# Patient Record
Sex: FEMALE | Race: WHITE | NOT HISPANIC OR LATINO | URBAN - METROPOLITAN AREA
[De-identification: names, ages, dates, MRNs, and addresses within clinical notes are randomized per-mention and may not be internally consistent; named-entity substitution may affect disease eponyms.]

---

## 2018-05-29 ENCOUNTER — OUTPATIENT (OUTPATIENT)
Dept: OUTPATIENT SERVICES | Facility: HOSPITAL | Age: 38
LOS: 1 days | Discharge: HOME | End: 2018-05-29

## 2018-05-29 DIAGNOSIS — F33.9 MAJOR DEPRESSIVE DISORDER, RECURRENT, UNSPECIFIED: ICD-10-CM

## 2018-08-02 ENCOUNTER — OUTPATIENT (OUTPATIENT)
Dept: OUTPATIENT SERVICES | Facility: HOSPITAL | Age: 38
LOS: 1 days | End: 2018-08-02
Payer: COMMERCIAL

## 2018-08-02 ENCOUNTER — APPOINTMENT (OUTPATIENT)
Dept: OBGYN | Facility: CLINIC | Age: 38
End: 2018-08-02
Payer: COMMERCIAL

## 2018-08-02 VITALS
DIASTOLIC BLOOD PRESSURE: 66 MMHG | BODY MASS INDEX: 20.24 KG/M2 | WEIGHT: 110 LBS | SYSTOLIC BLOOD PRESSURE: 100 MMHG | HEIGHT: 62 IN | HEART RATE: 78 BPM | OXYGEN SATURATION: 98 %

## 2018-08-02 DIAGNOSIS — R25.1 TREMOR, UNSPECIFIED: ICD-10-CM

## 2018-08-02 DIAGNOSIS — F32.9 MAJOR DEPRESSIVE DISORDER, SINGLE EPISODE, UNSPECIFIED: ICD-10-CM

## 2018-08-02 DIAGNOSIS — D21.9 BENIGN NEOPLASM OF CONNECTIVE AND OTHER SOFT TISSUE, UNSPECIFIED: ICD-10-CM

## 2018-08-02 DIAGNOSIS — N92.6 IRREGULAR MENSTRUATION, UNSPECIFIED: ICD-10-CM

## 2018-08-02 DIAGNOSIS — F41.9 ANXIETY DISORDER, UNSPECIFIED: ICD-10-CM

## 2018-08-02 DIAGNOSIS — F90.2 ATTENTION-DEFICIT HYPERACTIVITY DISORDER, COMBINED TYPE: ICD-10-CM

## 2018-08-02 DIAGNOSIS — Z80.0 FAMILY HISTORY OF MALIGNANT NEOPLASM OF DIGESTIVE ORGANS: ICD-10-CM

## 2018-08-02 DIAGNOSIS — Z82.49 FAMILY HISTORY OF ISCHEMIC HEART DISEASE AND OTHER DISEASES OF THE CIRCULATORY SYSTEM: ICD-10-CM

## 2018-08-02 DIAGNOSIS — Z83.3 FAMILY HISTORY OF DIABETES MELLITUS: ICD-10-CM

## 2018-08-02 LAB
HCG UR QL: NEGATIVE
QUALITY CONTROL: YES

## 2018-08-02 PROCEDURE — 76830 TRANSVAGINAL US NON-OB: CPT | Mod: 26

## 2018-08-02 PROCEDURE — 76856 US EXAM PELVIC COMPLETE: CPT | Mod: 26

## 2018-08-02 PROCEDURE — 76830 TRANSVAGINAL US NON-OB: CPT

## 2018-08-02 PROCEDURE — 99385 PREV VISIT NEW AGE 18-39: CPT

## 2018-08-02 PROCEDURE — 76856 US EXAM PELVIC COMPLETE: CPT

## 2018-08-02 RX ORDER — PROPRANOLOL HYDROCHLORIDE 80 MG/1
80 CAPSULE, EXTENDED RELEASE ORAL
Qty: 60 | Refills: 0 | Status: ACTIVE | COMMUNITY
Start: 2017-03-30

## 2018-08-02 RX ORDER — ZOLPIDEM TARTRATE 12.5 MG/1
12.5 TABLET, EXTENDED RELEASE ORAL
Qty: 30 | Refills: 0 | Status: ACTIVE | COMMUNITY
Start: 2018-02-19

## 2018-08-03 LAB
C TRACH RRNA SPEC QL NAA+PROBE: NOT DETECTED
HPV HIGH+LOW RISK DNA PNL CVX: NOT DETECTED
N GONORRHOEA RRNA SPEC QL NAA+PROBE: NOT DETECTED
SOURCE TP AMPLIFICATION: NORMAL

## 2018-08-07 LAB — CYTOLOGY CVX/VAG DOC THIN PREP: NORMAL

## 2018-08-27 ENCOUNTER — APPOINTMENT (OUTPATIENT)
Dept: PULMONOLOGY | Facility: CLINIC | Age: 38
End: 2018-08-27
Payer: COMMERCIAL

## 2018-08-27 VITALS
SYSTOLIC BLOOD PRESSURE: 110 MMHG | OXYGEN SATURATION: 99 % | WEIGHT: 110 LBS | HEIGHT: 62 IN | DIASTOLIC BLOOD PRESSURE: 60 MMHG | BODY MASS INDEX: 20.24 KG/M2 | HEART RATE: 82 BPM | TEMPERATURE: 97.6 F | RESPIRATION RATE: 15 BRPM

## 2018-08-27 DIAGNOSIS — F51.04 PSYCHOPHYSIOLOGIC INSOMNIA: ICD-10-CM

## 2018-08-27 PROCEDURE — 99204 OFFICE O/P NEW MOD 45 MIN: CPT | Mod: GC

## 2018-08-27 RX ORDER — DEXTROAMPHETAMINE SACCHARATE, AMPHETAMINE ASPARTATE, DEXTROAMPHETAMINE SULFATE AND AMPHETAMINE SULFATE 5; 5; 5; 5 MG/1; MG/1; MG/1; MG/1
20 TABLET ORAL
Qty: 30 | Refills: 0 | Status: DISCONTINUED | COMMUNITY
Start: 2018-03-01 | End: 2018-08-27

## 2018-08-27 RX ORDER — CYCLOBENZAPRINE HYDROCHLORIDE 5 MG/1
5 TABLET, FILM COATED ORAL
Qty: 20 | Refills: 0 | Status: DISCONTINUED | COMMUNITY
Start: 2018-02-27 | End: 2018-08-27

## 2018-08-27 RX ORDER — GABAPENTIN 600 MG/1
600 TABLET, COATED ORAL
Refills: 0 | Status: ACTIVE | COMMUNITY
Start: 2018-03-14

## 2018-08-27 RX ORDER — DOXEPIN HYDROCHLORIDE 25 MG/1
25 CAPSULE ORAL
Qty: 60 | Refills: 0 | Status: DISCONTINUED | COMMUNITY
Start: 2018-04-27 | End: 2018-08-27

## 2018-08-28 PROBLEM — F51.04 CHRONIC INSOMNIA: Status: ACTIVE | Noted: 2018-08-27

## 2018-10-15 ENCOUNTER — FORM ENCOUNTER (OUTPATIENT)
Age: 38
End: 2018-10-15

## 2018-10-16 ENCOUNTER — APPOINTMENT (OUTPATIENT)
Dept: ULTRASOUND IMAGING | Facility: CLINIC | Age: 38
End: 2018-10-16
Payer: COMMERCIAL

## 2018-10-16 ENCOUNTER — OTHER (OUTPATIENT)
Age: 38
End: 2018-10-16

## 2018-10-16 ENCOUNTER — OUTPATIENT (OUTPATIENT)
Dept: OUTPATIENT SERVICES | Facility: HOSPITAL | Age: 38
LOS: 1 days | End: 2018-10-16
Payer: COMMERCIAL

## 2018-10-16 ENCOUNTER — TRANSCRIPTION ENCOUNTER (OUTPATIENT)
Age: 38
End: 2018-10-16

## 2018-10-16 DIAGNOSIS — Z00.8 ENCOUNTER FOR OTHER GENERAL EXAMINATION: ICD-10-CM

## 2018-10-16 DIAGNOSIS — N83.209 UNSPECIFIED OVARIAN CYST, UNSPECIFIED SIDE: ICD-10-CM

## 2018-10-16 PROCEDURE — 76830 TRANSVAGINAL US NON-OB: CPT

## 2018-10-16 PROCEDURE — 76830 TRANSVAGINAL US NON-OB: CPT | Mod: 26

## 2018-10-16 PROCEDURE — 76856 US EXAM PELVIC COMPLETE: CPT

## 2018-10-16 PROCEDURE — 76856 US EXAM PELVIC COMPLETE: CPT | Mod: 26

## 2018-10-30 ENCOUNTER — APPOINTMENT (OUTPATIENT)
Dept: SURGERY | Facility: CLINIC | Age: 38
End: 2018-10-30
Payer: COMMERCIAL

## 2018-10-30 ENCOUNTER — LABORATORY RESULT (OUTPATIENT)
Age: 38
End: 2018-10-30

## 2018-10-30 VITALS
TEMPERATURE: 98.5 F | OXYGEN SATURATION: 100 % | RESPIRATION RATE: 15 BRPM | HEART RATE: 79 BPM | DIASTOLIC BLOOD PRESSURE: 80 MMHG | SYSTOLIC BLOOD PRESSURE: 117 MMHG

## 2018-10-30 DIAGNOSIS — G43.709 CHRONIC MIGRAINE W/OUT AURA, NOT INTRACTABLE, W/OUT STATUS MIGRAINOSUS: ICD-10-CM

## 2018-10-30 DIAGNOSIS — K59.00 CONSTIPATION, UNSPECIFIED: ICD-10-CM

## 2018-10-30 DIAGNOSIS — Z78.9 OTHER SPECIFIED HEALTH STATUS: ICD-10-CM

## 2018-10-30 DIAGNOSIS — K64.4 RESIDUAL HEMORRHOIDAL SKIN TAGS: ICD-10-CM

## 2018-10-30 DIAGNOSIS — K62.89 OTHER SPECIFIED DISEASES OF ANUS AND RECTUM: ICD-10-CM

## 2018-10-30 PROCEDURE — 99203 OFFICE O/P NEW LOW 30 MIN: CPT

## 2018-10-30 RX ORDER — DIAZEPAM 10 MG/1
10 TABLET ORAL
Refills: 0 | Status: DISCONTINUED | COMMUNITY
Start: 2018-03-16 | End: 2018-10-30

## 2018-10-30 RX ORDER — DEXTROAMPHETAMINE SACCHARATE, AMPHETAMINE ASPARTATE, DEXTROAMPHETAMINE SULFATE, AND AMPHETAMINE SULFATE 3.75; 3.75; 3.75; 3.75 MG/1; MG/1; MG/1; MG/1
15 TABLET ORAL
Refills: 0 | Status: ACTIVE | COMMUNITY

## 2018-10-30 RX ORDER — DEXTROAMPHETAMINE SACCHARATE, AMPHETAMINE ASPARTATE, DEXTROAMPHETAMINE SULFATE AND AMPHETAMINE SULFATE 3.75; 3.75; 3.75; 3.75 MG/1; MG/1; MG/1; MG/1
15 TABLET ORAL
Qty: 60 | Refills: 0 | Status: DISCONTINUED | COMMUNITY
Start: 2018-03-16 | End: 2018-10-30

## 2018-10-30 RX ORDER — SUVOREXANT 10 MG/1
10 TABLET, FILM COATED ORAL
Qty: 60 | Refills: 0 | Status: DISCONTINUED | COMMUNITY
Start: 2018-03-07 | End: 2018-10-30

## 2018-10-30 RX ORDER — VALACYCLOVIR 1 G/1
1 TABLET, FILM COATED ORAL
Qty: 60 | Refills: 0 | Status: DISCONTINUED | COMMUNITY
Start: 2018-07-26 | End: 2018-10-30

## 2018-10-30 RX ORDER — LINACLOTIDE 72 UG/1
CAPSULE, GELATIN COATED ORAL
Refills: 0 | Status: ACTIVE | COMMUNITY

## 2018-10-30 RX ORDER — DIAZEPAM 5 MG/1
TABLET ORAL
Refills: 0 | Status: ACTIVE | COMMUNITY

## 2018-10-30 RX ORDER — SUVOREXANT 20 MG/1
20 TABLET, FILM COATED ORAL
Qty: 60 | Refills: 0 | Status: DISCONTINUED | COMMUNITY
Start: 2018-04-02 | End: 2018-10-30

## 2018-10-30 RX ORDER — RAMELTEON 8 MG/1
8 TABLET, FILM COATED ORAL
Qty: 30 | Refills: 3 | Status: DISCONTINUED | COMMUNITY
Start: 2018-08-27 | End: 2018-10-30

## 2018-12-06 ENCOUNTER — APPOINTMENT (OUTPATIENT)
Dept: OBGYN | Facility: CLINIC | Age: 38
End: 2018-12-06
Payer: COMMERCIAL

## 2018-12-06 VITALS
BODY MASS INDEX: 21.16 KG/M2 | WEIGHT: 115 LBS | SYSTOLIC BLOOD PRESSURE: 102 MMHG | HEART RATE: 75 BPM | RESPIRATION RATE: 16 BRPM | OXYGEN SATURATION: 99 % | DIASTOLIC BLOOD PRESSURE: 76 MMHG | HEIGHT: 62 IN

## 2018-12-06 DIAGNOSIS — N76.0 ACUTE VAGINITIS: ICD-10-CM

## 2018-12-06 DIAGNOSIS — L29.2 PRURITUS VULVAE: ICD-10-CM

## 2018-12-06 DIAGNOSIS — N76.2 ACUTE VULVITIS: ICD-10-CM

## 2018-12-06 DIAGNOSIS — R39.9 UNSPECIFIED SYMPTOMS AND SIGNS INVOLVING THE GENITOURINARY SYSTEM: ICD-10-CM

## 2018-12-06 LAB
BILIRUB UR QL STRIP: NEGATIVE
GLUCOSE UR-MCNC: NEGATIVE
HCG UR QL: 0.2 EU/DL
HGB UR QL STRIP.AUTO: NEGATIVE
KETONES UR-MCNC: NEGATIVE
LEUKOCYTE ESTERASE UR QL STRIP: NEGATIVE
NITRITE UR QL STRIP: NEGATIVE
PH UR STRIP: 6.5
PROT UR STRIP-MCNC: NORMAL
SP GR UR STRIP: 1.03

## 2018-12-06 PROCEDURE — 99214 OFFICE O/P EST MOD 30 MIN: CPT

## 2018-12-07 LAB
C TRACH RRNA SPEC QL NAA+PROBE: NOT DETECTED
N GONORRHOEA RRNA SPEC QL NAA+PROBE: NOT DETECTED
SOURCE AMPLIFICATION: NORMAL

## 2018-12-09 ENCOUNTER — TRANSCRIPTION ENCOUNTER (OUTPATIENT)
Age: 38
End: 2018-12-09

## 2018-12-09 LAB
BACTERIA UR CULT: NORMAL
CANDIDA VAG CYTO: NOT DETECTED
G VAGINALIS+PREV SP MTYP VAG QL MICRO: DETECTED
T VAGINALIS VAG QL WET PREP: NOT DETECTED

## 2018-12-12 ENCOUNTER — OTHER (OUTPATIENT)
Age: 38
End: 2018-12-12

## 2018-12-13 ENCOUNTER — OTHER (OUTPATIENT)
Age: 38
End: 2018-12-13

## 2018-12-17 ENCOUNTER — TRANSCRIPTION ENCOUNTER (OUTPATIENT)
Age: 38
End: 2018-12-17

## 2019-05-06 ENCOUNTER — TRANSCRIPTION ENCOUNTER (OUTPATIENT)
Age: 39
End: 2019-05-06

## 2019-05-23 ENCOUNTER — APPOINTMENT (OUTPATIENT)
Dept: OBGYN | Facility: CLINIC | Age: 39
End: 2019-05-23
Payer: COMMERCIAL

## 2019-05-23 ENCOUNTER — APPOINTMENT (OUTPATIENT)
Dept: OBGYN | Facility: CLINIC | Age: 39
End: 2019-05-23

## 2019-05-23 VITALS
BODY MASS INDEX: 20.61 KG/M2 | DIASTOLIC BLOOD PRESSURE: 86 MMHG | SYSTOLIC BLOOD PRESSURE: 123 MMHG | HEART RATE: 73 BPM | WEIGHT: 112 LBS | HEIGHT: 62 IN

## 2019-05-23 DIAGNOSIS — Z31.69 ENCOUNTER FOR OTHER GENERAL COUNSELING AND ADVICE ON PROCREATION: ICD-10-CM

## 2019-05-23 PROCEDURE — 99213 OFFICE O/P EST LOW 20 MIN: CPT

## 2019-05-28 NOTE — PHYSICAL EXAM
[Alert] : alert [Awake] : awake [Acute Distress] : no acute distress [Oriented x3] : oriented to person, place, and time [Depressed Mood] : not depressed [Flat Affect] : affect not flat

## 2019-07-13 ENCOUNTER — APPOINTMENT (OUTPATIENT)
Dept: RHEUMATOLOGY | Facility: CLINIC | Age: 39
End: 2019-07-13
Payer: COMMERCIAL

## 2019-07-13 VITALS
DIASTOLIC BLOOD PRESSURE: 65 MMHG | HEART RATE: 84 BPM | BODY MASS INDEX: 20.24 KG/M2 | HEIGHT: 62 IN | SYSTOLIC BLOOD PRESSURE: 100 MMHG | TEMPERATURE: 98.6 F | WEIGHT: 110 LBS | OXYGEN SATURATION: 98 %

## 2019-07-13 DIAGNOSIS — G43.909 MIGRAINE, UNSPECIFIED, NOT INTRACTABLE, W/OUT STATUS MIGRAINOSUS: ICD-10-CM

## 2019-07-13 PROCEDURE — 99204 OFFICE O/P NEW MOD 45 MIN: CPT

## 2019-08-12 ENCOUNTER — LABORATORY RESULT (OUTPATIENT)
Age: 39
End: 2019-08-12

## 2019-08-12 ENCOUNTER — APPOINTMENT (OUTPATIENT)
Dept: RHEUMATOLOGY | Facility: CLINIC | Age: 39
End: 2019-08-12
Payer: COMMERCIAL

## 2019-08-12 VITALS
HEIGHT: 62 IN | HEART RATE: 58 BPM | TEMPERATURE: 97.5 F | OXYGEN SATURATION: 97 % | BODY MASS INDEX: 20.24 KG/M2 | DIASTOLIC BLOOD PRESSURE: 77 MMHG | SYSTOLIC BLOOD PRESSURE: 114 MMHG | WEIGHT: 110 LBS

## 2019-08-12 LAB — LUPUS ANTICOAGULANT CASCADE REFLEX: NORMAL

## 2019-08-12 PROCEDURE — 99215 OFFICE O/P EST HI 40 MIN: CPT

## 2019-08-13 LAB
25(OH)D3 SERPL-MCNC: 21.2 NG/ML
ALBUMIN SERPL ELPH-MCNC: 4.9 G/DL
ALP BLD-CCNC: 59 U/L
ALT SERPL-CCNC: 32 U/L
ANION GAP SERPL CALC-SCNC: 11 MMOL/L
APPEARANCE: CLEAR
AST SERPL-CCNC: 31 U/L
BACTERIA: ABNORMAL
BASOPHILS # BLD AUTO: 0.05 K/UL
BASOPHILS NFR BLD AUTO: 0.6 %
BILIRUB SERPL-MCNC: 0.2 MG/DL
BILIRUBIN URINE: NEGATIVE
BLOOD URINE: NEGATIVE
BUN SERPL-MCNC: 42 MG/DL
C3 SERPL-MCNC: 111 MG/DL
C4 SERPL-MCNC: 40 MG/DL
CALCIUM SERPL-MCNC: 10 MG/DL
CHLORIDE SERPL-SCNC: 105 MMOL/L
CK SERPL-CCNC: 289 U/L
CO2 SERPL-SCNC: 16 MMOL/L
COLOR: YELLOW
CREAT SERPL-MCNC: 1.73 MG/DL
CREAT SPEC-SCNC: 138 MG/DL
CREAT/PROT UR: 0.2 RATIO
ENA RNP AB SER IA-ACNC: <0.2 AL
ENA SM AB SER IA-ACNC: <0.2 AL
ENA SS-A AB SER IA-ACNC: <0.2 AL
ENA SS-B AB SER IA-ACNC: <0.2 AL
EOSINOPHIL # BLD AUTO: 0.01 K/UL
EOSINOPHIL NFR BLD AUTO: 0.1 %
GLUCOSE QUALITATIVE U: NEGATIVE
GLUCOSE SERPL-MCNC: 84 MG/DL
HCT VFR BLD CALC: 41.6 %
HGB BLD-MCNC: 13.8 G/DL
HYALINE CASTS: 1 /LPF
IMM GRANULOCYTES NFR BLD AUTO: 0.7 %
INR PPP: 0.96 RATIO
KETONES URINE: NEGATIVE
LEUKOCYTE ESTERASE URINE: NEGATIVE
LYMPHOCYTES # BLD AUTO: 2.91 K/UL
LYMPHOCYTES NFR BLD AUTO: 33.6 %
MAN DIFF?: NORMAL
MCHC RBC-ENTMCNC: 33.1 PG
MCHC RBC-ENTMCNC: 33.2 GM/DL
MCV RBC AUTO: 99.8 FL
MICROSCOPIC-UA: NORMAL
MONOCYTES # BLD AUTO: 0.7 K/UL
MONOCYTES NFR BLD AUTO: 8.1 %
NEUTROPHILS # BLD AUTO: 4.94 K/UL
NEUTROPHILS NFR BLD AUTO: 56.9 %
NITRITE URINE: NEGATIVE
PH URINE: 6
PLATELET # BLD AUTO: 329 K/UL
POTASSIUM SERPL-SCNC: 5.4 MMOL/L
PROT SERPL-MCNC: 8 G/DL
PROT UR-MCNC: 28 MG/DL
PROTEIN URINE: ABNORMAL
PT BLD: 10.8 SEC
RBC # BLD: 4.17 M/UL
RBC # FLD: 14 %
RED BLOOD CELLS URINE: 2 /HPF
SODIUM SERPL-SCNC: 132 MMOL/L
SPECIFIC GRAVITY URINE: 1.03
SQUAMOUS EPITHELIAL CELLS: 4 /HPF
THYROGLOB AB SERPL-ACNC: <20 IU/ML
THYROPEROXIDASE AB SERPL IA-ACNC: <10 IU/ML
TSH SERPL-ACNC: 5.54 UIU/ML
UROBILINOGEN URINE: NORMAL
WBC # FLD AUTO: 8.67 K/UL
WHITE BLOOD CELLS URINE: 4 /HPF

## 2019-08-14 LAB — DSDNA AB SER-ACNC: 34 IU/ML

## 2019-08-16 LAB — ANA SER IF-ACNC: NEGATIVE

## 2019-08-17 ENCOUNTER — LABORATORY RESULT (OUTPATIENT)
Age: 39
End: 2019-08-17

## 2019-08-17 ENCOUNTER — TRANSCRIPTION ENCOUNTER (OUTPATIENT)
Age: 39
End: 2019-08-17

## 2019-08-17 LAB
ANION GAP SERPL CALC-SCNC: 9 MMOL/L
BUN SERPL-MCNC: 18 MG/DL
CALCIUM SERPL-MCNC: 8.7 MG/DL
CHLORIDE SERPL-SCNC: 110 MMOL/L
CK SERPL-CCNC: 141 U/L
CO2 SERPL-SCNC: 24 MMOL/L
CREAT SERPL-MCNC: 1.03 MG/DL
GLUCOSE SERPL-MCNC: 96 MG/DL
POTASSIUM SERPL-SCNC: 4.7 MMOL/L
SODIUM SERPL-SCNC: 143 MMOL/L
T3RU NFR SERPL: 0.9 TBI
T4 SERPL-MCNC: 7.5 UG/DL
TSH SERPL-ACNC: 1.75 UIU/ML

## 2019-08-20 LAB
MYELOPEROXIDASE AB SER QL IA: <5 UNITS
MYELOPEROXIDASE CELLS FLD QL: NEGATIVE
PROTEINASE3 AB SER IA-ACNC: 16.4 UNITS
PROTEINASE3 AB SER-ACNC: NEGATIVE

## 2019-09-24 ENCOUNTER — TRANSCRIPTION ENCOUNTER (OUTPATIENT)
Age: 39
End: 2019-09-24

## 2019-10-26 ENCOUNTER — TRANSCRIPTION ENCOUNTER (OUTPATIENT)
Age: 39
End: 2019-10-26

## 2019-12-05 ENCOUNTER — RX RENEWAL (OUTPATIENT)
Age: 39
End: 2019-12-05

## 2019-12-05 ENCOUNTER — MEDICATION RENEWAL (OUTPATIENT)
Age: 39
End: 2019-12-05

## 2019-12-07 ENCOUNTER — MEDICATION RENEWAL (OUTPATIENT)
Age: 39
End: 2019-12-07

## 2020-03-18 ENCOUNTER — INPATIENT (INPATIENT)
Facility: HOSPITAL | Age: 40
LOS: 1 days | Discharge: HOME | DRG: 195 | End: 2020-03-20
Attending: INTERNAL MEDICINE | Admitting: HOSPITALIST
Payer: COMMERCIAL

## 2020-03-18 VITALS
WEIGHT: 119.93 LBS | DIASTOLIC BLOOD PRESSURE: 68 MMHG | HEIGHT: 62 IN | RESPIRATION RATE: 26 BRPM | SYSTOLIC BLOOD PRESSURE: 100 MMHG | OXYGEN SATURATION: 99 % | TEMPERATURE: 98 F | HEART RATE: 77 BPM

## 2020-03-18 DIAGNOSIS — G43.909 MIGRAINE, UNSPECIFIED, NOT INTRACTABLE, WITHOUT STATUS MIGRAINOSUS: ICD-10-CM

## 2020-03-18 DIAGNOSIS — J15.9 UNSPECIFIED BACTERIAL PNEUMONIA: ICD-10-CM

## 2020-03-18 DIAGNOSIS — M32.9 SYSTEMIC LUPUS ERYTHEMATOSUS, UNSPECIFIED: ICD-10-CM

## 2020-03-18 DIAGNOSIS — J18.9 PNEUMONIA, UNSPECIFIED ORGANISM: ICD-10-CM

## 2020-03-18 DIAGNOSIS — R09.89 OTHER SPECIFIED SYMPTOMS AND SIGNS INVOLVING THE CIRCULATORY AND RESPIRATORY SYSTEMS: ICD-10-CM

## 2020-03-18 DIAGNOSIS — G47.00 INSOMNIA, UNSPECIFIED: ICD-10-CM

## 2020-03-18 DIAGNOSIS — Z29.9 ENCOUNTER FOR PROPHYLACTIC MEASURES, UNSPECIFIED: ICD-10-CM

## 2020-03-18 DIAGNOSIS — E03.9 HYPOTHYROIDISM, UNSPECIFIED: ICD-10-CM

## 2020-03-18 LAB
ALBUMIN SERPL ELPH-MCNC: 3.5 G/DL — SIGNIFICANT CHANGE UP (ref 3.3–5)
ALP SERPL-CCNC: 56 U/L — SIGNIFICANT CHANGE UP (ref 40–120)
ALT FLD-CCNC: 26 U/L — SIGNIFICANT CHANGE UP (ref 10–45)
ANION GAP SERPL CALC-SCNC: 13 MMOL/L — SIGNIFICANT CHANGE UP (ref 5–17)
AST SERPL-CCNC: 28 U/L — SIGNIFICANT CHANGE UP (ref 10–40)
BASOPHILS # BLD AUTO: 0.04 K/UL — SIGNIFICANT CHANGE UP (ref 0–0.2)
BASOPHILS NFR BLD AUTO: 0.4 % — SIGNIFICANT CHANGE UP (ref 0–2)
BILIRUB SERPL-MCNC: 0.1 MG/DL — LOW (ref 0.2–1.2)
BUN SERPL-MCNC: 16 MG/DL — SIGNIFICANT CHANGE UP (ref 7–23)
CALCIUM SERPL-MCNC: 8.6 MG/DL — SIGNIFICANT CHANGE UP (ref 8.4–10.5)
CHLORIDE SERPL-SCNC: 105 MMOL/L — SIGNIFICANT CHANGE UP (ref 96–108)
CO2 SERPL-SCNC: 21 MMOL/L — LOW (ref 22–31)
CREAT SERPL-MCNC: 0.88 MG/DL — SIGNIFICANT CHANGE UP (ref 0.5–1.3)
EOSINOPHIL # BLD AUTO: 0.01 K/UL — SIGNIFICANT CHANGE UP (ref 0–0.5)
EOSINOPHIL NFR BLD AUTO: 0.1 % — SIGNIFICANT CHANGE UP (ref 0–6)
GLUCOSE SERPL-MCNC: 87 MG/DL — SIGNIFICANT CHANGE UP (ref 70–99)
HCT VFR BLD CALC: 35.1 % — SIGNIFICANT CHANGE UP (ref 34.5–45)
HGB BLD-MCNC: 11.1 G/DL — LOW (ref 11.5–15.5)
IMM GRANULOCYTES NFR BLD AUTO: 0.2 % — SIGNIFICANT CHANGE UP (ref 0–1.5)
LYMPHOCYTES # BLD AUTO: 1.18 K/UL — SIGNIFICANT CHANGE UP (ref 1–3.3)
LYMPHOCYTES # BLD AUTO: 13.3 % — SIGNIFICANT CHANGE UP (ref 13–44)
MCHC RBC-ENTMCNC: 31.6 GM/DL — LOW (ref 32–36)
MCHC RBC-ENTMCNC: 32.1 PG — SIGNIFICANT CHANGE UP (ref 27–34)
MCV RBC AUTO: 101.4 FL — HIGH (ref 80–100)
MONOCYTES # BLD AUTO: 0.63 K/UL — SIGNIFICANT CHANGE UP (ref 0–0.9)
MONOCYTES NFR BLD AUTO: 7.1 % — SIGNIFICANT CHANGE UP (ref 2–14)
NEUTROPHILS # BLD AUTO: 7.02 K/UL — SIGNIFICANT CHANGE UP (ref 1.8–7.4)
NEUTROPHILS NFR BLD AUTO: 78.9 % — HIGH (ref 43–77)
NRBC # BLD: 0 /100 WBCS — SIGNIFICANT CHANGE UP (ref 0–0)
PLATELET # BLD AUTO: 223 K/UL — SIGNIFICANT CHANGE UP (ref 150–400)
POTASSIUM SERPL-MCNC: 4.4 MMOL/L — SIGNIFICANT CHANGE UP (ref 3.5–5.3)
POTASSIUM SERPL-SCNC: 4.4 MMOL/L — SIGNIFICANT CHANGE UP (ref 3.5–5.3)
PROT SERPL-MCNC: 6.8 G/DL — SIGNIFICANT CHANGE UP (ref 6–8.3)
RAPID RVP RESULT: SIGNIFICANT CHANGE UP
RBC # BLD: 3.46 M/UL — LOW (ref 3.8–5.2)
RBC # FLD: 15.2 % — HIGH (ref 10.3–14.5)
SODIUM SERPL-SCNC: 139 MMOL/L — SIGNIFICANT CHANGE UP (ref 135–145)
WBC # BLD: 8.9 K/UL — SIGNIFICANT CHANGE UP (ref 3.8–10.5)
WBC # FLD AUTO: 8.9 K/UL — SIGNIFICANT CHANGE UP (ref 3.8–10.5)

## 2020-03-18 PROCEDURE — 99285 EMERGENCY DEPT VISIT HI MDM: CPT

## 2020-03-18 PROCEDURE — 71045 X-RAY EXAM CHEST 1 VIEW: CPT | Mod: 26

## 2020-03-18 PROCEDURE — 99223 1ST HOSP IP/OBS HIGH 75: CPT

## 2020-03-18 PROCEDURE — 93010 ELECTROCARDIOGRAM REPORT: CPT

## 2020-03-18 RX ORDER — HYDROXYCHLOROQUINE SULFATE 200 MG
1 TABLET ORAL
Qty: 0 | Refills: 0 | DISCHARGE

## 2020-03-18 RX ORDER — DIAZEPAM 5 MG
4 TABLET ORAL
Qty: 0 | Refills: 0 | DISCHARGE

## 2020-03-18 RX ORDER — ONDANSETRON 8 MG/1
4 TABLET, FILM COATED ORAL ONCE
Refills: 0 | Status: COMPLETED | OUTPATIENT
Start: 2020-03-18 | End: 2020-03-18

## 2020-03-18 RX ORDER — ZOLPIDEM TARTRATE 10 MG/1
5 TABLET ORAL AT BEDTIME
Refills: 0 | Status: DISCONTINUED | OUTPATIENT
Start: 2020-03-18 | End: 2020-03-20

## 2020-03-18 RX ORDER — SODIUM CHLORIDE 9 MG/ML
500 INJECTION INTRAMUSCULAR; INTRAVENOUS; SUBCUTANEOUS ONCE
Refills: 0 | Status: COMPLETED | OUTPATIENT
Start: 2020-03-18 | End: 2020-03-18

## 2020-03-18 RX ORDER — LEVOTHYROXINE SODIUM 125 MCG
1 TABLET ORAL
Qty: 0 | Refills: 0 | DISCHARGE

## 2020-03-18 RX ORDER — ONDANSETRON 8 MG/1
4 TABLET, FILM COATED ORAL EVERY 6 HOURS
Refills: 0 | Status: DISCONTINUED | OUTPATIENT
Start: 2020-03-18 | End: 2020-03-20

## 2020-03-18 RX ORDER — ACETAMINOPHEN 500 MG
975 TABLET ORAL ONCE
Refills: 0 | Status: DISCONTINUED | OUTPATIENT
Start: 2020-03-18 | End: 2020-03-18

## 2020-03-18 RX ORDER — IBUPROFEN 200 MG
400 TABLET ORAL ONCE
Refills: 0 | Status: COMPLETED | OUTPATIENT
Start: 2020-03-18 | End: 2020-03-18

## 2020-03-18 RX ORDER — HYDROXYCHLOROQUINE SULFATE 200 MG
200 TABLET ORAL
Refills: 0 | Status: DISCONTINUED | OUTPATIENT
Start: 2020-03-18 | End: 2020-03-20

## 2020-03-18 RX ORDER — DIAZEPAM 5 MG
20 TABLET ORAL ONCE
Refills: 0 | Status: DISCONTINUED | OUTPATIENT
Start: 2020-03-18 | End: 2020-03-18

## 2020-03-18 RX ORDER — ACETAMINOPHEN 500 MG
650 TABLET ORAL EVERY 6 HOURS
Refills: 0 | Status: DISCONTINUED | OUTPATIENT
Start: 2020-03-18 | End: 2020-03-20

## 2020-03-18 RX ORDER — LEVOTHYROXINE SODIUM 125 MCG
50 TABLET ORAL DAILY
Refills: 0 | Status: DISCONTINUED | OUTPATIENT
Start: 2020-03-18 | End: 2020-03-20

## 2020-03-18 RX ORDER — METOCLOPRAMIDE HCL 10 MG
10 TABLET ORAL ONCE
Refills: 0 | Status: DISCONTINUED | OUTPATIENT
Start: 2020-03-18 | End: 2020-03-18

## 2020-03-18 RX ORDER — AZITHROMYCIN 500 MG/1
500 TABLET, FILM COATED ORAL ONCE
Refills: 0 | Status: COMPLETED | OUTPATIENT
Start: 2020-03-18 | End: 2020-03-18

## 2020-03-18 RX ORDER — CEFTRIAXONE 500 MG/1
1000 INJECTION, POWDER, FOR SOLUTION INTRAMUSCULAR; INTRAVENOUS EVERY 24 HOURS
Refills: 0 | Status: DISCONTINUED | OUTPATIENT
Start: 2020-03-19 | End: 2020-03-20

## 2020-03-18 RX ORDER — ZOLPIDEM TARTRATE 10 MG/1
1 TABLET ORAL
Qty: 0 | Refills: 0 | DISCHARGE

## 2020-03-18 RX ORDER — ALBUTEROL 90 UG/1
2 AEROSOL, METERED ORAL EVERY 6 HOURS
Refills: 0 | Status: DISCONTINUED | OUTPATIENT
Start: 2020-03-18 | End: 2020-03-20

## 2020-03-18 RX ORDER — GABAPENTIN 400 MG/1
2 CAPSULE ORAL
Qty: 0 | Refills: 0 | DISCHARGE

## 2020-03-18 RX ORDER — CEFTRIAXONE 500 MG/1
1000 INJECTION, POWDER, FOR SOLUTION INTRAMUSCULAR; INTRAVENOUS ONCE
Refills: 0 | Status: COMPLETED | OUTPATIENT
Start: 2020-03-18 | End: 2020-03-18

## 2020-03-18 RX ORDER — PROPRANOLOL HCL 160 MG
1 CAPSULE, EXTENDED RELEASE 24HR ORAL
Qty: 0 | Refills: 0 | DISCHARGE

## 2020-03-18 RX ORDER — INFLUENZA VIRUS VACCINE 15; 15; 15; 15 UG/.5ML; UG/.5ML; UG/.5ML; UG/.5ML
0.5 SUSPENSION INTRAMUSCULAR ONCE
Refills: 0 | Status: DISCONTINUED | OUTPATIENT
Start: 2020-03-18 | End: 2020-03-20

## 2020-03-18 RX ADMIN — AZITHROMYCIN 500 MILLIGRAM(S): 500 TABLET, FILM COATED ORAL at 21:50

## 2020-03-18 RX ADMIN — Medication 100 MILLIGRAM(S): at 23:56

## 2020-03-18 RX ADMIN — Medication 400 MILLIGRAM(S): at 19:01

## 2020-03-18 RX ADMIN — SODIUM CHLORIDE 500 MILLILITER(S): 9 INJECTION INTRAMUSCULAR; INTRAVENOUS; SUBCUTANEOUS at 17:41

## 2020-03-18 RX ADMIN — CEFTRIAXONE 1000 MILLIGRAM(S): 500 INJECTION, POWDER, FOR SOLUTION INTRAMUSCULAR; INTRAVENOUS at 17:42

## 2020-03-18 RX ADMIN — Medication 200 MILLIGRAM(S): at 23:56

## 2020-03-18 RX ADMIN — SODIUM CHLORIDE 500 MILLILITER(S): 9 INJECTION INTRAMUSCULAR; INTRAVENOUS; SUBCUTANEOUS at 17:42

## 2020-03-18 RX ADMIN — Medication 650 MILLIGRAM(S): at 23:30

## 2020-03-18 RX ADMIN — AZITHROMYCIN 250 MILLIGRAM(S): 500 TABLET, FILM COATED ORAL at 17:42

## 2020-03-18 RX ADMIN — Medication 400 MILLIGRAM(S): at 21:50

## 2020-03-18 RX ADMIN — ONDANSETRON 4 MILLIGRAM(S): 8 TABLET, FILM COATED ORAL at 19:01

## 2020-03-18 RX ADMIN — ALBUTEROL 2 PUFF(S): 90 AEROSOL, METERED ORAL at 17:41

## 2020-03-18 RX ADMIN — Medication 650 MILLIGRAM(S): at 23:00

## 2020-03-18 RX ADMIN — Medication 20 MILLIGRAM(S): at 23:56

## 2020-03-18 RX ADMIN — CEFTRIAXONE 100 MILLIGRAM(S): 500 INJECTION, POWDER, FOR SOLUTION INTRAMUSCULAR; INTRAVENOUS at 17:42

## 2020-03-18 NOTE — ED PROVIDER NOTE - PROGRESS NOTE DETAILS
This patient is a non [facility] employee and seen in the Emergency department.   Staff did use appropriate PPE.

## 2020-03-18 NOTE — H&P ADULT - PROBLEM SELECTOR PLAN 4
-Takes Reyvow which his special order  -Also takes nasal med too  -Follows with neurologist  -Can give some reglan PRN for now, may need neurology consult if uncontrolled given severity and degree of tertiary treatments outpatient  -Cont. propranolol, which she also takes for shaking?

## 2020-03-18 NOTE — H&P ADULT - NSICDXPASTMEDICALHX_GEN_ALL_CORE_FT
PAST MEDICAL HISTORY:  Hypothyroid     Insomnia     Lupus (systemic lupus erythematosus)     Migraine

## 2020-03-18 NOTE — ED PROVIDER NOTE - OBJECTIVE STATEMENT
41 y/o F PMHx of SLE on Plaquenil presents c/o cough, sore throat, SOB, fever x 1 week, was seen outpt at drive through COVID testing facility, swabbed for COVID-19, and told to go to ED for further evaluation. Pt denies using pain medications/antipyretics today. Pt denies known lung disease (asthma,COPD,reactive airway dz). Pt denies n/v/d, abd pain, CP, HA, nuchal rigidity, confusion, dysphonia, trismus, travel, contact with known COVID-19 positive pt.

## 2020-03-18 NOTE — H&P ADULT - PROBLEM SELECTOR PLAN 2
Complicated by back pain and joint issues. Has needed hydrocortisone injections  -Cont. plaquenil BID

## 2020-03-18 NOTE — H&P ADULT - PROBLEM SELECTOR PLAN 1
RUL opacity seems more suspicious for bacterial PNA? aspiration? Although unable to r/o.  -Cont. IV ceftriaxone  -Will send BCx in AM  -guaifenisin for cough RUL opacity seems more suspicious for bacterial PNA? aspiration? Although unable to r/o.  -Cont. IV ceftriaxone  -Will send BCx in AM  -guaifenisin for cough  -F/u COVID  -Cont. COVID precautions for now

## 2020-03-18 NOTE — ED PROVIDER NOTE - PHYSICAL EXAMINATION
GEN: Pt in NAD, A&O x3.  PSYCH: Affect appropriate.  EYES: Sclera white w/o injection.   ENT: Head NCAT. Nose w/o deformity. No auricular TTP. MMM, uvula midline, posteiror pharynx erythematous no exudates, no trismus, no drooling, no dysphonia. Neck supple FROM.   RESP: End expiratory wheeze, sonorous rhonchi clear with cough, faint crackles b/l lower lung fields.  CARDIAC: RRR, clear distinct S1, S2, no appreciable murmurs.  ABD: Abdomen soft, non-tender. No CVAT b/l.  VASC: 2+ radial and dorsalis pedis pulses b/l. No edema or calf tenderness.  SKIN: No rashes on the trunk. GEN: Pt in NAD, A&O x3.  PSYCH: Affect appropriate.  EYES: Sclera white w/o injection.   ENT: Head NCAT. Nose w/o deformity. No auricular TTP. MMM, uvula midline, posterior pharynx erythematous no exudates, no trismus, no drooling, no dysphonia. Neck supple FROM.   RESP: End expiratory wheeze, sonorous rhonchi clear with cough, faint crackles b/l lower lung fields.  CARDIAC: RRR, clear distinct S1, S2, no appreciable murmurs.  ABD: Abdomen soft, non-tender. No CVAT b/l.  VASC: 2+ radial and dorsalis pedis pulses b/l. No edema or calf tenderness.  SKIN: No rashes on the trunk.

## 2020-03-18 NOTE — H&P ADULT - ASSESSMENT
40F w/ hx of SLE on plaquenil, severe insomnia, migraines, tremors, hypothyroid, muscle/joint/back issues p/w SOB, fever, cough x 1 week consistent with RUL pneumonia and need to rule out COVID

## 2020-03-18 NOTE — H&P ADULT - PROBLEM SELECTOR PLAN 5
ISTOP reviewed Reference #: [id number]  Patient endorses severe insomnia. Patient takes ambien 12.5mg CR at home and diazepam 40mg PO PRN insomnia at home. I informed patient we cannot give her this dose now.  -Will start diazepam 20mg QHS PRN insomnia.   -If pt tolerates and also still not effective can give PRN ambien dose  -Also takes gabapentin 1200mg QHS which I will hold during this acute illness until pt appears to be able to tolerate these doses during an acute illness.

## 2020-03-18 NOTE — ED ADULT NURSE NOTE - OBJECTIVE STATEMENT
pt has a history of lupus and is here for body aches, fever and night sweats.  she went to a testing facility and was sent to the ER

## 2020-03-18 NOTE — H&P ADULT - NSHPPHYSICALEXAM_GEN_ALL_CORE
Vital Signs Last 24 Hrs  T(C): 36.7 (03-18-20 @ 22:04), Max: 36.9 (03-18-20 @ 14:08)  T(F): 98 (03-18-20 @ 22:04), Max: 98.5 (03-18-20 @ 14:08)  HR: 76 (03-18-20 @ 22:04) (76 - 77)  BP: 116/77 (03-18-20 @ 22:04) (100/68 - 116/77)  BP(mean): --  RR: 24 (03-18-20 @ 22:04) (24 - 26)  SpO2: 99% (03-18-20 @ 22:04) (99% - 99%)

## 2020-03-18 NOTE — H&P ADULT - ATTENDING COMMENTS
I was asked to see this patient by the hospitalist in charge overnight. Day hospitalist to assume care for patient in AM and thereafter

## 2020-03-18 NOTE — ED PROVIDER NOTE - CLINICAL SUMMARY MEDICAL DECISION MAKING FREE TEXT BOX
ATTG: : cough wih concern of worsening shortness of breath. concerns include but not limited to pna / covid-19, will check labs, check xray and abx, likely admit to hospital for further care

## 2020-03-18 NOTE — ED ADULT TRIAGE NOTE - CHIEF COMPLAINT QUOTE
hx of lupus, asthma, fever, night sweats, bodyaches x 1 wk. went to drive up clinic and advised to come to ER for testing of COVID 19

## 2020-03-18 NOTE — ED PROVIDER NOTE - ATTENDING CONTRIBUTION TO CARE
39 y/o f with pmhx SLE, presents for concern of cough, sore throat, SOB, fever x 1 week, was seen outpt at drive through COVID testing facility, swabbed for COVID-19, and told to go to ED for further evaluation. Pt denies using pain medications/antipyretics today. no abd pain no headache. no weakness or numbness.  no known COVID + exposures, no recent travel.   Gen.  no acute distress  HEENT:  perrl eomi  Lungs:  + expiratory wheeze, + rhonchi clear with cough, bibasilar crackles  CVS: S1S2   Abd;  soft non tender no distention  Ext: no pitting edema or calf tenderness.   Neuro: aaox3 no focal deficits  MSK: strength 5/5 b/l upper and lower ext

## 2020-03-19 LAB
ANION GAP SERPL CALC-SCNC: 10 MMOL/L — SIGNIFICANT CHANGE UP (ref 5–17)
APTT BLD: 27.7 SEC — SIGNIFICANT CHANGE UP (ref 27.5–36.3)
BASOPHILS # BLD AUTO: 0.02 K/UL — SIGNIFICANT CHANGE UP (ref 0–0.2)
BASOPHILS NFR BLD AUTO: 0.3 % — SIGNIFICANT CHANGE UP (ref 0–2)
BUN SERPL-MCNC: 12 MG/DL — SIGNIFICANT CHANGE UP (ref 7–23)
CALCIUM SERPL-MCNC: 8.3 MG/DL — LOW (ref 8.4–10.5)
CHLORIDE SERPL-SCNC: 107 MMOL/L — SIGNIFICANT CHANGE UP (ref 96–108)
CO2 SERPL-SCNC: 22 MMOL/L — SIGNIFICANT CHANGE UP (ref 22–31)
CREAT SERPL-MCNC: 0.81 MG/DL — SIGNIFICANT CHANGE UP (ref 0.5–1.3)
EOSINOPHIL # BLD AUTO: 0.03 K/UL — SIGNIFICANT CHANGE UP (ref 0–0.5)
EOSINOPHIL NFR BLD AUTO: 0.4 % — SIGNIFICANT CHANGE UP (ref 0–6)
GLUCOSE SERPL-MCNC: 94 MG/DL — SIGNIFICANT CHANGE UP (ref 70–99)
HCT VFR BLD CALC: 33.3 % — LOW (ref 34.5–45)
HGB BLD-MCNC: 10.6 G/DL — LOW (ref 11.5–15.5)
IMM GRANULOCYTES NFR BLD AUTO: 0.4 % — SIGNIFICANT CHANGE UP (ref 0–1.5)
INR BLD: 1.04 RATIO — SIGNIFICANT CHANGE UP (ref 0.88–1.16)
LYMPHOCYTES # BLD AUTO: 1.66 K/UL — SIGNIFICANT CHANGE UP (ref 1–3.3)
LYMPHOCYTES # BLD AUTO: 21.7 % — SIGNIFICANT CHANGE UP (ref 13–44)
MAGNESIUM SERPL-MCNC: 2.3 MG/DL — SIGNIFICANT CHANGE UP (ref 1.6–2.6)
MCHC RBC-ENTMCNC: 31.8 GM/DL — LOW (ref 32–36)
MCHC RBC-ENTMCNC: 32.1 PG — SIGNIFICANT CHANGE UP (ref 27–34)
MCV RBC AUTO: 100.9 FL — HIGH (ref 80–100)
MONOCYTES # BLD AUTO: 0.6 K/UL — SIGNIFICANT CHANGE UP (ref 0–0.9)
MONOCYTES NFR BLD AUTO: 7.8 % — SIGNIFICANT CHANGE UP (ref 2–14)
NEUTROPHILS # BLD AUTO: 5.32 K/UL — SIGNIFICANT CHANGE UP (ref 1.8–7.4)
NEUTROPHILS NFR BLD AUTO: 69.4 % — SIGNIFICANT CHANGE UP (ref 43–77)
NRBC # BLD: 0 /100 WBCS — SIGNIFICANT CHANGE UP (ref 0–0)
PLATELET # BLD AUTO: 183 K/UL — SIGNIFICANT CHANGE UP (ref 150–400)
POTASSIUM SERPL-MCNC: 3.8 MMOL/L — SIGNIFICANT CHANGE UP (ref 3.5–5.3)
POTASSIUM SERPL-SCNC: 3.8 MMOL/L — SIGNIFICANT CHANGE UP (ref 3.5–5.3)
PROCALCITONIN SERPL-MCNC: 0.07 NG/ML — SIGNIFICANT CHANGE UP (ref 0.02–0.1)
PROTHROM AB SERPL-ACNC: 11.9 SEC — SIGNIFICANT CHANGE UP (ref 10–13.1)
RBC # BLD: 3.3 M/UL — LOW (ref 3.8–5.2)
RBC # FLD: 15.3 % — HIGH (ref 10.3–14.5)
SODIUM SERPL-SCNC: 139 MMOL/L — SIGNIFICANT CHANGE UP (ref 135–145)
TSH SERPL-MCNC: 1.36 UIU/ML — SIGNIFICANT CHANGE UP (ref 0.27–4.2)
WBC # BLD: 7.66 K/UL — SIGNIFICANT CHANGE UP (ref 3.8–10.5)
WBC # FLD AUTO: 7.66 K/UL — SIGNIFICANT CHANGE UP (ref 3.8–10.5)

## 2020-03-19 PROCEDURE — 99233 SBSQ HOSP IP/OBS HIGH 50: CPT

## 2020-03-19 RX ORDER — TRAMADOL HYDROCHLORIDE 50 MG/1
25 TABLET ORAL ONCE
Refills: 0 | Status: DISCONTINUED | OUTPATIENT
Start: 2020-03-19 | End: 2020-03-19

## 2020-03-19 RX ORDER — AZITHROMYCIN 500 MG/1
500 TABLET, FILM COATED ORAL EVERY 24 HOURS
Refills: 0 | Status: DISCONTINUED | OUTPATIENT
Start: 2020-03-19 | End: 2020-03-20

## 2020-03-19 RX ORDER — DIAZEPAM 5 MG
10 TABLET ORAL EVERY 6 HOURS
Refills: 0 | Status: DISCONTINUED | OUTPATIENT
Start: 2020-03-19 | End: 2020-03-20

## 2020-03-19 RX ORDER — METOCLOPRAMIDE HCL 10 MG
10 TABLET ORAL ONCE
Refills: 0 | Status: COMPLETED | OUTPATIENT
Start: 2020-03-19 | End: 2020-03-19

## 2020-03-19 RX ORDER — GABAPENTIN 400 MG/1
1200 CAPSULE ORAL AT BEDTIME
Refills: 0 | Status: DISCONTINUED | OUTPATIENT
Start: 2020-03-19 | End: 2020-03-20

## 2020-03-19 RX ADMIN — AZITHROMYCIN 250 MILLIGRAM(S): 500 TABLET, FILM COATED ORAL at 13:13

## 2020-03-19 RX ADMIN — Medication 1 TABLET(S): at 16:42

## 2020-03-19 RX ADMIN — Medication 100 MILLIGRAM(S): at 06:08

## 2020-03-19 RX ADMIN — Medication 100 MILLIGRAM(S): at 07:17

## 2020-03-19 RX ADMIN — Medication 10 MILLIGRAM(S): at 23:15

## 2020-03-19 RX ADMIN — Medication 650 MILLIGRAM(S): at 06:07

## 2020-03-19 RX ADMIN — Medication 200 MILLIGRAM(S): at 18:23

## 2020-03-19 RX ADMIN — CEFTRIAXONE 100 MILLIGRAM(S): 500 INJECTION, POWDER, FOR SOLUTION INTRAMUSCULAR; INTRAVENOUS at 16:40

## 2020-03-19 RX ADMIN — ZOLPIDEM TARTRATE 5 MILLIGRAM(S): 10 TABLET ORAL at 01:43

## 2020-03-19 RX ADMIN — TRAMADOL HYDROCHLORIDE 25 MILLIGRAM(S): 50 TABLET ORAL at 11:14

## 2020-03-19 RX ADMIN — Medication 50 MICROGRAM(S): at 06:07

## 2020-03-19 RX ADMIN — Medication 10 MILLIGRAM(S): at 18:22

## 2020-03-19 RX ADMIN — Medication 1 TABLET(S): at 21:21

## 2020-03-19 RX ADMIN — GABAPENTIN 1200 MILLIGRAM(S): 400 CAPSULE ORAL at 21:21

## 2020-03-19 RX ADMIN — Medication 1 TABLET(S): at 23:15

## 2020-03-19 RX ADMIN — Medication 100 MILLIGRAM(S): at 00:30

## 2020-03-19 RX ADMIN — Medication 10 MILLIGRAM(S): at 10:02

## 2020-03-19 RX ADMIN — Medication 200 MILLIGRAM(S): at 06:07

## 2020-03-19 NOTE — PROGRESS NOTE ADULT - PROBLEM SELECTOR PLAN 5
ISTOP reviewed Reference #: [id number]  Patient endorses severe insomnia. Patient takes ambien 12.5mg CR at home and diazepam 40mg PO PRN insomnia at home.   -continue home dose  -Also takes gabapentin 1200mg QHS which I will hold during this acute illness until pt appears to be able to tolerate these doses during an acute illness.

## 2020-03-19 NOTE — PROGRESS NOTE ADULT - PROVIDER SPECIALTY LIST ADULT
Hospitalist Cryotherapy Text: The wound bed was treated with cryotherapy after the biopsy was performed.

## 2020-03-19 NOTE — PROGRESS NOTE ADULT - SUBJECTIVE AND OBJECTIVE BOX
PROGRESS NOTE:   Authoted by Dr. Raleigh Walker DO  Pager [phone]     Patient is a 40y old  Female who presents with a chief complaint of Cough, fatigue, sore throat, COVID r/o (18 Mar 2020 22:14)      SUBJECTIVE / OVERNIGHT EVENTS: Patietn admitted overnight. Patient complaining of non-productive cough and migraine. Migraine is old has had prior admissions to OSH for migraine.  Also complaining of being in isolation. No known sick contacts. Livse with parents.    ADDITIONAL REVIEW OF SYSTEMS:    MEDICATIONS  (STANDING):  azithromycin  IVPB 500 milliGRAM(s) IV Intermittent every 24 hours  cefTRIAXone   IVPB 1000 milliGRAM(s) IV Intermittent every 24 hours  diazepam    Tablet 10 milliGRAM(s) Oral every 6 hours  gabapentin 1200 milliGRAM(s) Oral at bedtime  hydroxychloroquine 200 milliGRAM(s) Oral two times a day  influenza   Vaccine 0.5 milliLiter(s) IntraMuscular once  levothyroxine 50 MICROGram(s) Oral daily  propranolol 80 milliGRAM(s) Oral two times a day    MEDICATIONS  (PRN):  acetaminophen   Tablet .. 650 milliGRAM(s) Oral every 6 hours PRN Mild Pain (1 - 3), Moderate Pain (4 - 6)  acetaminophen 325 mG/butalbital 50 mG/caffeine 40 mG 1 Tablet(s) Oral every 4 hours PRN headaches  ALBUTerol    90 MICROgram(s) HFA Inhaler 2 Puff(s) Inhalation every 6 hours PRN Shortness of Breath and/or Wheezing  benzonatate 100 milliGRAM(s) Oral every 8 hours PRN Cough  guaiFENesin   Syrup  (Sugar-Free) 100 milliGRAM(s) Oral every 6 hours PRN Cough  ondansetron Injectable 4 milliGRAM(s) IV Push every 6 hours PRN Nausea and/or Vomiting  zolpidem 5 milliGRAM(s) Oral at bedtime PRN Insomnia      CAPILLARY BLOOD GLUCOSE        I&O's Summary    18 Mar 2020 07:01  -  19 Mar 2020 07:00  --------------------------------------------------------  IN: 0 mL / OUT: 450 mL / NET: -450 mL    19 Mar 2020 07:01  -  19 Mar 2020 20:11  --------------------------------------------------------  IN: 490 mL / OUT: 2 mL / NET: 488 mL          PHYSICAL EXAM:  Vital Signs Last 24 Hrs  T(C): 37.1 (19 Mar 2020 19:25), Max: 37.1 (19 Mar 2020 06:09)  T(F): 98.7 (19 Mar 2020 19:25), Max: 98.7 (19 Mar 2020 06:09)  HR: 87 (19 Mar 2020 19:25) (75 - 90)  BP: 114/74 (19 Mar 2020 19:25) (91/60 - 116/77)  BP(mean): --  RR: 18 (19 Mar 2020 19:25) (18 - 24)  SpO2: 100% (19 Mar 2020 19:25) (97% - 100%)    CONSTITUTIONAL: NAD, well-developed  RESPIRATORY: Normal respiratory effort; lungs are clear to auscultation bilaterally  CARDIOVASCULAR: Regular rate and rhythm, normal S1 and S2, no murmur/rub/gallop; No lower extremity edema; Peripheral pulses are 2+ bilaterally  ABDOMEN: Nontender to palpation, normoactive bowel sounds, no rebound/guarding; No hepatosplenomegaly  MUSCLOSKELETAL: no clubbing or cyanosis of digits; no joint swelling or tenderness to palpation  PSYCH: A+O to person, place, and time; affect appropriate    LABS:                        10.6   7.66  )-----------( 183      ( 19 Mar 2020 07:16 )             33.3     03-19    139  |  107  |  12  ----------------------------<  94  3.8   |  22  |  0.81    Ca    8.3<L>      19 Mar 2020 07:16  Mg     2.3     03-19    TPro  6.8  /  Alb  3.5  /  TBili  0.1<L>  /  DBili  x   /  AST  28  /  ALT  26  /  AlkPhos  56  03-18    PT/INR - ( 19 Mar 2020 08:56 )   PT: 11.9 sec;   INR: 1.04 ratio         PTT - ( 19 Mar 2020 08:56 )  PTT:27.7 sec            RADIOLOGY & ADDITIONAL TESTS:  Results Reviewed:   Imaging Personally Reviewed:  < from: Xray Chest 1 View- PORTABLE-Routine (03.18.20 @ 16:34) >    Impression:    The heart is normal in size. Right upper lobe pneumonia. The left lung appears to be clear. No pleural effusion. No pneumothorax. No acute bony pathology could be identified.    < end of copied text >    Electrocardiogram Personally Reviewed:    COORDINATION OF CARE:  Care Discussed with Consultants/Other Providers [Y/N]:  Prior or Outpatient Records Reviewed [Y/N]:

## 2020-03-19 NOTE — PROGRESS NOTE ADULT - PROBLEM SELECTOR PLAN 4
-Takes Reyvow which his special order  -Also takes nasal med too  -Follows with neurologist  -fioricet, gabapentin for now, hopefully d/c next 24-48 hours and can take home medications.  -Cont. propranolol, which she also takes for shaking?

## 2020-03-19 NOTE — CHART NOTE - NSCHARTNOTEFT_GEN_A_CORE
Istop reference check     This report was requested by: Félix Rhodes | Reference #: [id number]         Patient Name: Bere Gray     YOB: 1980   Address: [address] [place] [address]   Sex: Female                     Rx Written    Rx Dispensed    Drug    Quantity    Days Supply    Prescriber Name              2020/01/27 2020/03/16 zolpidem tart er 12.5 mg tab  30 30 Fortunato Phipps MD     2020/03/03 2020/03/10 diazepam 10 mg tablet  120 30 [place] valerio MD     2020/01/27 2020/02/14 zolpidem tart er 12.5 mg tab  30 30 [place] valerio MD     2020/01/28 2020/02/10 diazepam 10 mg tablet  120 30 [place] valerio MD     2019/10/11 2020/01/16 zolpidem tart er 12.5 mg tab  30 30 [place] valerio MD     2020/01/07 2020/01/11 diazepam 10 mg tablet  120 30 [place] valerio MD     2019/10/11 2019/12/17 zolpidem tart er 12.5 mg tab  30 30 [place] valerio MD     2019/12/12 2019/12/12 diazepam 10 mg tablet  120 30 [place] valerio MD     2019/12/05 2019/12/06 dextroamp-amphetamin 15 mg tab  60 30 Fortunato Phipps MD     2019/10/11 2019/11/16 zolpidem tart er 12.5 mg tab  30 30 [place] valerio MD     2019/11/10 2019/11/16 dextroamp-amphetamin 15 mg tab  30 30 Fortunato Phipps MD     2019/11/10 2019/11/11 diazepam 10 mg tablet  120 30 Fortunato Phipps MD     2019/10/24 2019/10/26 alprazolam 1 mg tablet  90 30 [place] valerio MD     2019/10/11 2019/10/17 zolpidem tart er 12.5 mg tab  30 30 Fortunato Phipps MD     2019/10/11 2019/10/17 dextroamp-amphetamin 15 mg tab  30 30 Fortunato Phipps MD     2019/04/18 2019/09/18 zolpidem tart er 12.5 mg tab  30 30 Fortunato Phipps MD     2019/09/16 2019/09/18 diazepam 5 mg tablet  30 30 [place], Fortunato ODE MD     2019/09/16 2019/09/18 dextroamp-amphetamin 15 mg tab  30 30 [place], Fortunato DOE MD     2019/06/20 2019/08/20 zolpidem tart er 12.5 mg tab  30 30 [place], Fortunato DOE MD     2019/08/12 2019/08/20 diazepam 5 mg tablet  30 30 [place], Fortunato DOE MD     2019/08/12 2019/08/20 dextroamp-amphetamin 15 mg tab  30 30 [place], Fortunato DOE MD     2019/08/12 2019/08/20 diazepam 2 mg tablet  30 30 [place], Fortunato DOE MD     2019/06/20 2019/07/22 zolpidem tart er 12.5 mg tab  30 30 [place], Fortunato DOE MD     2019/07/16 2019/07/22 dextroamp-amphetamin 15 mg tab  30 30 [place], Fortunato DOE MD     2019/07/16 2019/07/22 diazepam 2 mg tablet  30 30 [place], Fortunato DOE MD     2019/07/16 2019/07/22 diazepam 5 mg tablet  30 30 [place], Fortunato DOE MD     2019/06/20 2019/06/21 zolpidem tart er 12.5 mg tab  30 30 [place], Fortunato DOE MD     2019/06/20 2019/06/21 diazepam 10 mg tablet  30 30 [place], Fortunato DOE MD     2019/06/20 2019/06/21 dextroamp-amphetamin 15 mg tab  30 30 [place], Fortunato DOE MD     2019/05/28 2019/05/30 dextroamp-amphetamin 15 mg tab  30 30 [place], Fortunato DOE MD     2019/05/28 2019/05/30 diazepam 10 mg tablet  30 30 [place], Fortunato DOE MD     2019/04/18 2019/05/24 zolpidem tart er 12.5 mg tab  30 30 [place], Fortunato DOE MD     2019/04/18 2019/04/26 dextroamp-amphetamin 15 mg tab  30 30 [place], Fortunato DOE MD     2019/04/18 2019/04/23 zolpidem tart er 12.5 mg tab  30 30 [place], Fortunato DOE MD     2019/04/18 2019/04/19 diazepam 10 mg tablet  75 30 [place], Fortunato DOE MD     2019/03/27 2019/03/28 dextroamp-amphetamin 15 mg tab  30 30 [place], Fortunato S MD     2019/01/17 2019/03/25 zolpidem tart er 12.5 mg tab  30 30 Fortunato Phipps MD     2019/03/20 2019/03/20 diazepam 10 mg tablet  90 30 Fortunato Phipps MD

## 2020-03-19 NOTE — PROGRESS NOTE ADULT - ATTENDING COMMENTS
even if COVID positive, low risk patient, saturating % RA can likely be discharged home with home isolation.   if negative, will d/c on levaquin to complete CAP coverage.

## 2020-03-19 NOTE — PROGRESS NOTE ADULT - ASSESSMENT
40F w/ hx of SLE on plaquenil, severe insomnia, migraines, tremors, hypothyroid, muscle/joint/back issues p/w SOB, fever, cough x 1 week consistent with RUL pneumonia, r/o COVID

## 2020-03-19 NOTE — PROGRESS NOTE ADULT - PROBLEM SELECTOR PLAN 1
RUL opacity seems more suspicious for bacterial PNA? aspiration? Although unable to r/o.  -Cont. IV ceftriaxone  -Will send BCx in AM  -guaifenisin for cough  -F/u COVID, sentin ED  -not lymphopenic, have lower suspicion for her.   -Cont. COVID precautions for now

## 2020-03-20 ENCOUNTER — TRANSCRIPTION ENCOUNTER (OUTPATIENT)
Age: 40
End: 2020-03-20

## 2020-03-20 VITALS
TEMPERATURE: 98 F | RESPIRATION RATE: 18 BRPM | OXYGEN SATURATION: 97 % | DIASTOLIC BLOOD PRESSURE: 72 MMHG | SYSTOLIC BLOOD PRESSURE: 108 MMHG | HEART RATE: 80 BPM

## 2020-03-20 LAB
CRP SERPL-MCNC: 5.17 MG/DL — HIGH (ref 0–0.4)
LEGIONELLA AG UR QL: NEGATIVE — SIGNIFICANT CHANGE UP
SARS-COV-2 RNA SPEC QL NAA+PROBE: SIGNIFICANT CHANGE UP

## 2020-03-20 PROCEDURE — 87040 BLOOD CULTURE FOR BACTERIA: CPT

## 2020-03-20 PROCEDURE — 80048 BASIC METABOLIC PNL TOTAL CA: CPT

## 2020-03-20 PROCEDURE — U0001: CPT

## 2020-03-20 PROCEDURE — 83735 ASSAY OF MAGNESIUM: CPT

## 2020-03-20 PROCEDURE — 99285 EMERGENCY DEPT VISIT HI MDM: CPT | Mod: 25

## 2020-03-20 PROCEDURE — 96374 THER/PROPH/DIAG INJ IV PUSH: CPT

## 2020-03-20 PROCEDURE — 93005 ELECTROCARDIOGRAM TRACING: CPT

## 2020-03-20 PROCEDURE — 71045 X-RAY EXAM CHEST 1 VIEW: CPT

## 2020-03-20 PROCEDURE — 85610 PROTHROMBIN TIME: CPT

## 2020-03-20 PROCEDURE — 85027 COMPLETE CBC AUTOMATED: CPT

## 2020-03-20 PROCEDURE — 94640 AIRWAY INHALATION TREATMENT: CPT

## 2020-03-20 PROCEDURE — 87486 CHLMYD PNEUM DNA AMP PROBE: CPT

## 2020-03-20 PROCEDURE — 87581 M.PNEUMON DNA AMP PROBE: CPT

## 2020-03-20 PROCEDURE — 84145 PROCALCITONIN (PCT): CPT

## 2020-03-20 PROCEDURE — 86140 C-REACTIVE PROTEIN: CPT

## 2020-03-20 PROCEDURE — 87449 NOS EACH ORGANISM AG IA: CPT

## 2020-03-20 PROCEDURE — 87798 DETECT AGENT NOS DNA AMP: CPT

## 2020-03-20 PROCEDURE — 85730 THROMBOPLASTIN TIME PARTIAL: CPT

## 2020-03-20 PROCEDURE — 87633 RESP VIRUS 12-25 TARGETS: CPT

## 2020-03-20 PROCEDURE — 84443 ASSAY THYROID STIM HORMONE: CPT

## 2020-03-20 PROCEDURE — 80053 COMPREHEN METABOLIC PANEL: CPT

## 2020-03-20 PROCEDURE — 99239 HOSP IP/OBS DSCHRG MGMT >30: CPT

## 2020-03-20 PROCEDURE — 96375 TX/PRO/DX INJ NEW DRUG ADDON: CPT

## 2020-03-20 RX ORDER — ALBUTEROL 90 UG/1
2 AEROSOL, METERED ORAL
Qty: 40 | Refills: 0
Start: 2020-03-20 | End: 2020-03-24

## 2020-03-20 RX ORDER — CIPROFLOXACIN LACTATE 400MG/40ML
1 VIAL (ML) INTRAVENOUS
Qty: 4 | Refills: 0
Start: 2020-03-20 | End: 2020-03-23

## 2020-03-20 RX ADMIN — Medication 50 MICROGRAM(S): at 06:36

## 2020-03-20 RX ADMIN — Medication 200 MILLIGRAM(S): at 06:36

## 2020-03-20 RX ADMIN — Medication 100 MILLIGRAM(S): at 00:26

## 2020-03-20 RX ADMIN — ZOLPIDEM TARTRATE 5 MILLIGRAM(S): 10 TABLET ORAL at 00:26

## 2020-03-20 RX ADMIN — Medication 10 MILLIGRAM(S): at 06:36

## 2020-03-20 RX ADMIN — Medication 1 TABLET(S): at 06:37

## 2020-03-20 NOTE — DISCHARGE NOTE NURSING/CASE MANAGEMENT/SOCIAL WORK - PATIENT PORTAL LINK FT
You can access the FollowMyHealth Patient Portal offered by  by registering at the following website: [email or web address]. By joining [employer]’s FollowMyHealth portal, you will also be able to view your health information using other applications (apps) compatible with our system.

## 2020-03-20 NOTE — DISCHARGE NOTE PROVIDER - CARE PROVIDER_API CALL
Fortunato Phipps)  Psychiatry  [address] [address]  Phone: [phone]  Fax: [phone]  Follow Up Time: 1-3 days

## 2020-03-20 NOTE — DISCHARGE NOTE PROVIDER - HOSPITAL COURSE
40F w/ hx of SLE on Plaquenil, severe insomnia, migraines, tremors, hypothyroid, muscle/joint/back issues p/w SOB, fever, cough x 1 week consistent with RUL pneumonia, r/o COVID 19     Pneumonia of right upper lobe due to infectious organism.  Chest x-ray with RUL opacity seems more suspicious for bacterial Pneumonia? aspiration? started on  IV ceftriaxone. COVID 19 negative    and worsening of symptoms, pt will discharge home to complete oral antibiotic levaquin 500 and follow up with own primary care doctors and psychiatric Dr. Phipps in 2-3 days.

## 2020-03-20 NOTE — DISCHARGE NOTE PROVIDER - NSDCMRMEDTOKEN_GEN_ALL_CORE_FT
albuterol 90 mcg/inh inhalation aerosol: 2 puff(s) inhaled every 6 hours, As needed, Shortness of Breath and/or Wheezing  Ambien CR 12.5 mg oral tablet, extended release: 1 tab(s) orally once a day (at bedtime)  diazePAM 10 mg oral tablet: 4 tab(s) orally once a day (at bedtime), As Needed for insomnia  gabapentin 600 mg oral tablet: 2 tab(s) orally once a day (at bedtime)  guaiFENesin 100 mg/5 mL oral liquid: 5 milliliter(s) orally every 6 hours, As needed, Cough  Levaquin 500 mg oral tablet: 1 tab(s) orally once a day   Levaquin 500 mg oral tablet: 1 tab(s) orally once a day   levothyroxine 50 mcg (0.05 mg) oral tablet: 1 tab(s) orally once a day  Plaquenil 200 mg oral tablet: 1 tab(s) orally 2 times a day  propranolol 80 mg oral capsule, extended release: 1 cap(s) orally 2 times a day albuterol 90 mcg/inh inhalation aerosol: 2 puff(s) inhaled every 6 hours, As needed, Shortness of Breath and/or Wheezing  Ambien CR 12.5 mg oral tablet, extended release: 1 tab(s) orally once a day (at bedtime)  benzonatate 100 mg oral capsule: 1 cap(s) orally every 8 hours, As needed, Cough  diazePAM 10 mg oral tablet: 4 tab(s) orally once a day (at bedtime), As Needed for insomnia  gabapentin 600 mg oral tablet: 2 tab(s) orally once a day (at bedtime)  guaiFENesin 100 mg/5 mL oral liquid: 5 milliliter(s) orally every 6 hours, As needed, Cough  Levaquin 500 mg oral tablet: 1 tab(s) orally once a day   levothyroxine 50 mcg (0.05 mg) oral tablet: 1 tab(s) orally once a day  Plaquenil 200 mg oral tablet: 1 tab(s) orally 2 times a day  propranolol 80 mg oral capsule, extended release: 1 cap(s) orally 2 times a day

## 2020-03-20 NOTE — DISCHARGE NOTE PROVIDER - NSDCCPCAREPLAN_GEN_ALL_CORE_FT
PRINCIPAL DISCHARGE DIAGNOSIS  Diagnosis: Community acquired bacterial pneumonia  Assessment and Plan of Treatment: Complete full dose of antibiotic  Levaquin 500 mg daily for 4 days  Continue Albuterol and Guaofnesin syrup as instructed      SECONDARY DISCHARGE DIAGNOSES  Diagnosis: Hypothyroidism, unspecified type  Assessment and Plan of Treatment: Hypothyroidism, unspecified type  CXontinue Levothyroxine    Diagnosis: Migraine without status migrainosus, not intractable, unspecified migraine type  Assessment and Plan of Treatment: Migraine without status migrainosus, not intractable, unspecified migraine type  Continue current medications    Diagnosis: Systemic lupus erythematosus, unspecified SLE type, unspecified organ involvement status  Assessment and Plan of Treatment: Systemic lupus erythematosus, unspecified SLE type, unspecified organ involvement status  Continue Plaquenil      Diagnosis: Insomnia, unspecified type  Assessment and Plan of Treatment: Insomnia, unspecified type  Continue Ambiem

## 2020-03-24 LAB
CULTURE RESULTS: SIGNIFICANT CHANGE UP
CULTURE RESULTS: SIGNIFICANT CHANGE UP
SPECIMEN SOURCE: SIGNIFICANT CHANGE UP
SPECIMEN SOURCE: SIGNIFICANT CHANGE UP

## 2020-06-28 ENCOUNTER — TRANSCRIPTION ENCOUNTER (OUTPATIENT)
Age: 40
End: 2020-06-28

## 2020-08-29 ENCOUNTER — TRANSCRIPTION ENCOUNTER (OUTPATIENT)
Age: 40
End: 2020-08-29

## 2020-08-31 ENCOUNTER — APPOINTMENT (OUTPATIENT)
Dept: OBGYN | Facility: CLINIC | Age: 40
End: 2020-08-31
Payer: COMMERCIAL

## 2020-08-31 DIAGNOSIS — B37.3 CANDIDIASIS OF VULVA AND VAGINA: ICD-10-CM

## 2020-08-31 PROCEDURE — 99213 OFFICE O/P EST LOW 20 MIN: CPT | Mod: 95

## 2020-08-31 NOTE — HISTORY OF PRESENT ILLNESS
[Home] : at home, [unfilled] , at the time of the visit. [Medical Office: ([facility])___] : at the medical office located in  [Verbal consent obtained from patient] : the patient, [unfilled]

## 2020-09-01 PROBLEM — E03.9 HYPOTHYROIDISM, UNSPECIFIED: Chronic | Status: ACTIVE | Noted: 2020-03-18

## 2020-09-01 PROBLEM — M32.9 SYSTEMIC LUPUS ERYTHEMATOSUS, UNSPECIFIED: Chronic | Status: ACTIVE | Noted: 2020-03-18

## 2020-09-01 PROBLEM — G47.00 INSOMNIA, UNSPECIFIED: Chronic | Status: ACTIVE | Noted: 2020-03-18

## 2020-09-01 PROBLEM — G43.909 MIGRAINE, UNSPECIFIED, NOT INTRACTABLE, WITHOUT STATUS MIGRAINOSUS: Chronic | Status: ACTIVE | Noted: 2020-03-18

## 2020-12-16 PROBLEM — N76.0 ACUTE VAGINITIS: Status: RESOLVED | Noted: 2018-12-06 | Resolved: 2020-12-16

## 2021-01-04 ENCOUNTER — NON-APPOINTMENT (OUTPATIENT)
Age: 41
End: 2021-01-04

## 2021-02-16 ENCOUNTER — APPOINTMENT (OUTPATIENT)
Dept: RHEUMATOLOGY | Facility: CLINIC | Age: 41
End: 2021-02-16
Payer: COMMERCIAL

## 2021-02-16 ENCOUNTER — LABORATORY RESULT (OUTPATIENT)
Age: 41
End: 2021-02-16

## 2021-02-16 DIAGNOSIS — M25.551 PAIN IN RIGHT HIP: ICD-10-CM

## 2021-02-16 PROCEDURE — 99072 ADDL SUPL MATRL&STAF TM PHE: CPT

## 2021-02-16 PROCEDURE — 99214 OFFICE O/P EST MOD 30 MIN: CPT

## 2021-02-16 RX ORDER — METHYLPREDNISOLONE 4 MG/1
4 TABLET ORAL
Qty: 60 | Refills: 0 | Status: ACTIVE | COMMUNITY
Start: 2019-07-13 | End: 1900-01-01

## 2021-02-17 VITALS — HEART RATE: 78 BPM | RESPIRATION RATE: 15 BRPM | DIASTOLIC BLOOD PRESSURE: 76 MMHG | SYSTOLIC BLOOD PRESSURE: 115 MMHG

## 2021-02-17 PROBLEM — M25.551 HIP PAIN, RIGHT: Status: ACTIVE | Noted: 2021-02-17

## 2021-02-17 LAB
25(OH)D3 SERPL-MCNC: 27.3 NG/ML
ALBUMIN SERPL ELPH-MCNC: 4.6 G/DL
ALP BLD-CCNC: 56 U/L
ALT SERPL-CCNC: 26 U/L
ANION GAP SERPL CALC-SCNC: 17 MMOL/L
APPEARANCE: ABNORMAL
APTT BLD: 33.4 SEC
AST SERPL-CCNC: 27 U/L
BACTERIA: ABNORMAL
BASOPHILS # BLD AUTO: 0.05 K/UL
BASOPHILS NFR BLD AUTO: 0.7 %
BILIRUB SERPL-MCNC: 0.2 MG/DL
BILIRUBIN URINE: NEGATIVE
BLOOD URINE: NEGATIVE
BUN SERPL-MCNC: 26 MG/DL
C3 SERPL-MCNC: 104 MG/DL
C4 SERPL-MCNC: 38 MG/DL
CALCIUM SERPL-MCNC: 9.1 MG/DL
CHLORIDE SERPL-SCNC: 103 MMOL/L
CK SERPL-CCNC: 195 U/L
CO2 SERPL-SCNC: 19 MMOL/L
COLOR: YELLOW
CONFIRM: 31.1 SEC
CREAT SERPL-MCNC: 1.04 MG/DL
CREAT SPEC-SCNC: 78 MG/DL
CREAT/PROT UR: 0.2 RATIO
DEPRECATED KAPPA LC FREE/LAMBDA SER: 1.07 RATIO
DRVVT IMM 1:2 NP PPP: NORMAL
DRVVT SCREEN TO CONFIRM RATIO: 1.02 RATIO
EOSINOPHIL # BLD AUTO: 0.13 K/UL
EOSINOPHIL NFR BLD AUTO: 1.7 %
GLUCOSE QUALITATIVE U: NEGATIVE
HCT VFR BLD CALC: 38 %
HGB BLD-MCNC: 12.3 G/DL
HYALINE CASTS: 1 /LPF
IGA SER QL IEP: 173 MG/DL
IGG SER QL IEP: 1052 MG/DL
IGM SER QL IEP: 104 MG/DL
IMM GRANULOCYTES NFR BLD AUTO: 0.4 %
INR PPP: 1.02 RATIO
KAPPA LC CSF-MCNC: 2.04 MG/DL
KAPPA LC SERPL-MCNC: 2.18 MG/DL
KETONES URINE: NEGATIVE
LEUKOCYTE ESTERASE URINE: ABNORMAL
LYMPHOCYTES # BLD AUTO: 2.19 K/UL
LYMPHOCYTES NFR BLD AUTO: 29.1 %
MAN DIFF?: NORMAL
MCHC RBC-ENTMCNC: 32.4 GM/DL
MCHC RBC-ENTMCNC: 34.5 PG
MCV RBC AUTO: 106.4 FL
MICROSCOPIC-UA: NORMAL
MONOCYTES # BLD AUTO: 0.49 K/UL
MONOCYTES NFR BLD AUTO: 6.5 %
NEUTROPHILS # BLD AUTO: 4.64 K/UL
NEUTROPHILS NFR BLD AUTO: 61.6 %
NITRITE URINE: NEGATIVE
PH URINE: 6
PLATELET # BLD AUTO: 333 K/UL
POTASSIUM SERPL-SCNC: 4.4 MMOL/L
PROT SERPL-MCNC: 7.2 G/DL
PROT UR-MCNC: 18 MG/DL
PROTEIN URINE: ABNORMAL
PT BLD: 12.1 SEC
RBC # BLD: 3.57 M/UL
RBC # FLD: 13.8 %
RED BLOOD CELLS URINE: 4 /HPF
SCREEN DRVVT: 42.9 SEC
SILICA CLOTTING TIME INTERPRETATION: NORMAL
SILICA CLOTTING TIME S/C: 0.94 RATIO
SODIUM SERPL-SCNC: 139 MMOL/L
SPECIFIC GRAVITY URINE: 1.02
SQUAMOUS EPITHELIAL CELLS: 2 /HPF
TSH SERPL-ACNC: 2.44 UIU/ML
UROBILINOGEN URINE: NORMAL
WBC # FLD AUTO: 7.53 K/UL
WHITE BLOOD CELLS URINE: 104 /HPF

## 2021-02-17 NOTE — ASSESSMENT
[FreeTextEntry1] : 1. SLE: Developed alopecia and autoimmune thyroid disease in 2011 and then SLE by dermatology in 2012 based on alopecia and arthralgias. A scalp biopsy confirmed the diagnosis of cutaneous lupus. She was placed on hydroxychloroquine and prn prednisone.  Since that time she has had fatigue, arthralgias, occasional rash but no nephritis, thrombosis, serositis. SLE labs have not been very remarkable-low levels of DNA Ab, LINDSEY negative. \par 2.  OB history:\par      a.  Pregnancy No. 1: complicated by fibroids and was terminated\par      b.  Contemplating pregnancy\par 3.  Hydroxychloroquine use: advised to reduce dose, which she tried.  However, she could not tolerate the worsened symptoms when she tapered.  Therefore, she increased the dose back to 400 mg/d.\par 4.  Hip pain: was evaluated and treated with steroid injections by pain management. 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 Springer at [facility] who dismissed her. \par \par Plan:\par 1.  Extensive lab tests\par 2.  Review MRI report\par 3.  Contact me one week\par 4.  Eye MD\par 5.  Try to avoid steroids\par 6.  Consider reducing hydroxychloroquine dose\par 7.  F/U pain management and Ortho\par

## 2021-02-17 NOTE — REVIEW OF SYSTEMS
[Feeling Tired] : feeling tired [As Noted in HPI] : as noted in HPI [Negative] : Heme/Lymph [de-identified] : migraine

## 2021-02-17 NOTE — HISTORY OF PRESENT ILLNESS
[FreeTextEntry1] : 1. SLE: Developed alopecia and autoimmune thyroid disease in 2011 and then SLE by dermatology in 2012 based on alopecia and arthralgias. A scalp biopsy confirmed the diagnosis of cutaneous lupus. She was placed on hydroxychloroquine and prn prednisone.  Since that time she has had fatigue, arthralgias, occasional rash but no nephritis, thrombosis, serositis. SLE labs have not been very remarkable-low levels of DNA Ab, LINDSEY negative. \par 2.  OB history:\par      a.  Pregnancy No. 1: complicated by fibroids and was terminated\par      b.  Contemplating pregnancy\par 3.  Hydroxychloroquine use: advised to reduce dose, which she tried.  However, she could not tolerate the worsened symptoms when she tapered.  Therefore, she increased the dose back to 400 mg/d.\par 4.  Hip pain: was evaluated and treated with steroid injections by pain management. 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 Springer at [facility] who dismissed her. \par \par Meds include:\par Hydroxychloroquine 400 mg/d\par Celecoxib 200 mg/d\par Medrol prn\par \par  [Fatigue] : fatigue [Chest Pain] : chest pain [Arthralgias] : arthralgias [Joint Swelling] : joint swelling [Dyspnea] : dyspnea [Myalgias] : myalgias

## 2021-02-17 NOTE — PHYSICAL EXAM
[General Appearance - Alert] : alert [General Appearance - In No Acute Distress] : in no acute distress [General Appearance - Well Nourished] : well nourished [General Appearance - Well-Appearing] : healthy appearing [Sclera] : the sclera and conjunctiva were normal [Extraocular Movements] : extraocular movements were intact [Strabismus] : no strabismus was seen [Outer Ear] : the ears and nose were normal in appearance [Oropharynx] : the oropharynx was normal [Neck Appearance] : the appearance of the neck was normal [Neck Cervical Mass (___cm)] : no neck mass was observed [Jugular Venous Distention Increased] : there was no jugular-venous distention [Thyroid Diffuse Enlargement] : the thyroid was not enlarged [Auscultation Breath Sounds / Voice Sounds] : lungs were clear to auscultation bilaterally [Heart Sounds] : normal S1 and S2 [Heart Rate And Rhythm] : heart rate was normal and rhythm regular [Heart Sounds Gallop] : no gallops [Murmurs] : no murmurs [Heart Sounds Pericardial Friction Rub] : no pericardial rub [Arterial Pulses Carotid] : carotid pulses were normal with no bruits [Full Pulse] : the pedal pulses are present [Edema] : there was no peripheral edema [Bowel Sounds] : normal bowel sounds [Abdomen Soft] : soft [Abdomen Tenderness] : non-tender [Abdomen Mass (___ Cm)] : no abdominal mass palpated [Cervical Lymph Nodes Enlarged Posterior Bilaterally] : posterior cervical [Cervical Lymph Nodes Enlarged Anterior Bilaterally] : anterior cervical [Supraclavicular Lymph Nodes Enlarged Bilaterally] : supraclavicular [No Spinal Tenderness] : no spinal tenderness [No CVA Tenderness] : no ~M costovertebral angle tenderness [Abnormal Walk] : normal gait [Nail Clubbing] : no clubbing  or cyanosis of the fingernails [Musculoskeletal - Swelling] : no joint swelling seen [Skin Color & Pigmentation] : normal skin color and pigmentation [Motor Tone] : muscle strength and tone were normal [Skin Turgor] : normal skin turgor [] : no rash [Sensation] : the sensory exam was normal to light touch and pinprick [Motor Exam] : the motor exam was normal [Oriented To Time, Place, And Person] : oriented to person, place, and time [Impaired Insight] : insight and judgment were intact [Affect] : the affect was normal

## 2021-02-18 ENCOUNTER — APPOINTMENT (OUTPATIENT)
Dept: OBGYN | Facility: CLINIC | Age: 41
End: 2021-02-18

## 2021-02-18 LAB
ANA SER IF-ACNC: NEGATIVE
ENA RNP AB SER IA-ACNC: <0.2 AL
ENA SM AB SER IA-ACNC: <0.2 AL
ENA SS-A AB SER IA-ACNC: <0.2 AL
ENA SS-B AB SER IA-ACNC: <0.2 AL

## 2021-02-19 ENCOUNTER — NON-APPOINTMENT (OUTPATIENT)
Age: 41
End: 2021-02-19

## 2021-02-19 DIAGNOSIS — D75.89 OTHER SPECIFIED DISEASES OF BLOOD AND BLOOD-FORMING ORGANS: ICD-10-CM

## 2021-02-19 LAB
DSDNA AB SER-ACNC: <12 IU/ML
IGG SUBSET TOTAL IGG: 979 MG/DL
IGG1 SER-MCNC: 406 MG/DL
IGG2 SER-MCNC: 476 MG/DL
IGG3 SER-MCNC: 27 MG/DL
IGG4 SER-MCNC: 12 MG/DL

## 2021-02-21 LAB
B2 GLYCOPROT1 IGA SERPL IA-ACNC: <5 SAU
B2 GLYCOPROT1 IGG SER-ACNC: <5 SGU
B2 GLYCOPROT1 IGM SER-ACNC: <5 SMU
CARDIOLIPIN IGM SER-MCNC: 7.2 MPL
CARDIOLIPIN IGM SER-MCNC: <5 GPL
DEPRECATED CARDIOLIPIN IGA SER: <5 APL
FOLATE SERPL-MCNC: >20 NG/ML
VIT B12 SERPL-MCNC: 715 PG/ML

## 2021-02-23 LAB — BACTERIA UR CULT: ABNORMAL

## 2021-02-27 LAB
MISCELLANEOUS TEST: NORMAL
PROC NAME: NORMAL

## 2021-03-02 ENCOUNTER — APPOINTMENT (OUTPATIENT)
Dept: ENDOCRINOLOGY | Facility: CLINIC | Age: 41
End: 2021-03-02
Payer: COMMERCIAL

## 2021-03-02 VITALS
HEART RATE: 82 BPM | SYSTOLIC BLOOD PRESSURE: 112 MMHG | RESPIRATION RATE: 15 BRPM | TEMPERATURE: 97.8 F | HEIGHT: 62 IN | WEIGHT: 123 LBS | OXYGEN SATURATION: 100 % | BODY MASS INDEX: 22.63 KG/M2 | DIASTOLIC BLOOD PRESSURE: 79 MMHG

## 2021-03-02 DIAGNOSIS — F17.200 NICOTINE DEPENDENCE, UNSPECIFIED, UNCOMPLICATED: ICD-10-CM

## 2021-03-02 DIAGNOSIS — Z83.49 FAMILY HISTORY OF OTHER ENDOCRINE, NUTRITIONAL AND METABOLIC DISEASES: ICD-10-CM

## 2021-03-02 PROCEDURE — 99204 OFFICE O/P NEW MOD 45 MIN: CPT

## 2021-03-02 PROCEDURE — 99072 ADDL SUPL MATRL&STAF TM PHE: CPT

## 2021-03-02 RX ORDER — ONABOTULINUMTOXINA 200 [USP'U]/1
200 INJECTION, POWDER, LYOPHILIZED, FOR SOLUTION INTRADERMAL; INTRAMUSCULAR
Qty: 1 | Refills: 0 | Status: DISCONTINUED | COMMUNITY
Start: 2020-11-19

## 2021-03-02 RX ORDER — LEVOFLOXACIN 500 MG/1
500 TABLET, FILM COATED ORAL
Qty: 10 | Refills: 0 | Status: DISCONTINUED | COMMUNITY
Start: 2020-10-31

## 2021-03-02 RX ORDER — METRONIDAZOLE 7.5 MG/G
0.75 GEL VAGINAL
Qty: 1 | Refills: 3 | Status: DISCONTINUED | COMMUNITY
Start: 2018-12-09 | End: 2021-03-02

## 2021-03-02 RX ORDER — METHYLPREDNISOLONE 4 MG/1
4 TABLET ORAL
Qty: 21 | Refills: 0 | Status: DISCONTINUED | COMMUNITY
Start: 2020-12-31

## 2021-03-02 RX ORDER — NYSTATIN AND TRIAMCINOLONE ACETONIDE 100000; 1 MG/G; MG/G
100000-0.1 CREAM TOPICAL 3 TIMES DAILY
Qty: 1 | Refills: 1 | Status: DISCONTINUED | COMMUNITY
Start: 2020-08-31 | End: 2021-03-02

## 2021-03-02 RX ORDER — DOXEPIN HYDROCHLORIDE 25 MG/1
25 CAPSULE ORAL
Refills: 0 | Status: DISCONTINUED | COMMUNITY
End: 2021-03-02

## 2021-03-02 RX ORDER — PRUCALOPRIDE 2 MG/1
2 TABLET, FILM COATED ORAL
Qty: 30 | Refills: 0 | Status: DISCONTINUED | COMMUNITY
Start: 2021-01-29

## 2021-03-02 RX ORDER — FLUCONAZOLE 200 MG/1
200 TABLET ORAL
Qty: 4 | Refills: 0 | Status: DISCONTINUED | COMMUNITY
Start: 2020-08-31 | End: 2021-03-02

## 2021-03-02 RX ORDER — VORTIOXETINE 10 MG/1
10 TABLET, FILM COATED ORAL
Qty: 30 | Refills: 0 | Status: DISCONTINUED | COMMUNITY
Start: 2021-01-20

## 2021-03-02 RX ORDER — GALCANEZUMAB 120 MG/ML
120 INJECTION, SOLUTION SUBCUTANEOUS
Qty: 1 | Refills: 0 | Status: DISCONTINUED | COMMUNITY
Start: 2021-02-27

## 2021-03-02 RX ORDER — CLOBETASOL PROPIONATE 0.05 G/100ML
0.05 SHAMPOO TOPICAL
Qty: 118 | Refills: 0 | Status: DISCONTINUED | COMMUNITY
Start: 2020-10-09

## 2021-03-02 RX ORDER — METRONIDAZOLE 500 MG/1
500 TABLET ORAL
Qty: 14 | Refills: 0 | Status: DISCONTINUED | COMMUNITY
Start: 2020-09-01

## 2021-03-02 RX ORDER — SUVOREXANT 20 MG/1
20 TABLET, FILM COATED ORAL
Refills: 0 | Status: DISCONTINUED | COMMUNITY
End: 2021-03-02

## 2021-03-02 RX ORDER — VALACYCLOVIR 1 G/1
1 TABLET, FILM COATED ORAL
Refills: 0 | Status: ACTIVE | COMMUNITY
Start: 2017-07-29

## 2021-03-02 RX ORDER — LUBIPROSTONE 24 UG/1
24 CAPSULE, GELATIN COATED ORAL
Qty: 60 | Refills: 0 | Status: DISCONTINUED | COMMUNITY
Start: 2018-11-27 | End: 2021-03-02

## 2021-03-02 RX ORDER — PREGABALIN 200 MG/1
200 CAPSULE ORAL
Qty: 60 | Refills: 0 | Status: DISCONTINUED | COMMUNITY
Start: 2020-11-19

## 2021-03-02 RX ORDER — ONABOTULINUMTOXINA 100 [USP'U]/1
100 INJECTION, POWDER, LYOPHILIZED, FOR SOLUTION INTRADERMAL; INTRAMUSCULAR
Qty: 2 | Refills: 0 | Status: DISCONTINUED | COMMUNITY
Start: 2020-09-10

## 2021-03-02 RX ORDER — DOXYCYCLINE HYCLATE 100 MG/1
100 TABLET ORAL
Qty: 20 | Refills: 0 | Status: DISCONTINUED | COMMUNITY
Start: 2020-12-31

## 2021-03-02 RX ORDER — TOPIRAMATE 100 MG/1
100 TABLET, FILM COATED ORAL TWICE DAILY
Refills: 0 | Status: DISCONTINUED | COMMUNITY
Start: 2017-09-26 | End: 2021-03-02

## 2021-03-02 RX ORDER — KETOROLAC TROMETHAMINE 15.75 MG/1
15.75 SPRAY, METERED NASAL
Qty: 5 | Refills: 0 | Status: DISCONTINUED | COMMUNITY
Start: 2018-08-23 | End: 2021-03-02

## 2021-03-02 RX ORDER — BENZONATATE 100 MG/1
100 CAPSULE ORAL
Qty: 30 | Refills: 0 | Status: DISCONTINUED | COMMUNITY
Start: 2020-12-23

## 2021-03-02 RX ORDER — GABAPENTIN 300 MG/1
300 CAPSULE ORAL
Qty: 90 | Refills: 0 | Status: DISCONTINUED | COMMUNITY
Start: 2021-01-16

## 2021-03-02 RX ORDER — ALBUTEROL SULFATE 90 UG/1
108 (90 BASE) INHALANT RESPIRATORY (INHALATION)
Qty: 8 | Refills: 0 | Status: DISCONTINUED | COMMUNITY
Start: 2020-10-26

## 2021-03-02 RX ORDER — NYSTATIN AND TRIAMCINOLONE ACETONIDE 100000; 1 [USP'U]/G; MG/G
100000-0.1 OINTMENT TOPICAL
Qty: 1 | Refills: 1 | Status: DISCONTINUED | COMMUNITY
Start: 2018-12-06 | End: 2021-03-02

## 2021-03-02 RX ORDER — MINOXIDIL 2.5 MG/1
TABLET ORAL
Refills: 0 | Status: ACTIVE | COMMUNITY

## 2021-03-02 RX ORDER — AZITHROMYCIN 250 MG/1
250 TABLET, FILM COATED ORAL
Qty: 6 | Refills: 0 | Status: DISCONTINUED | COMMUNITY
Start: 2020-10-26

## 2021-03-02 RX ORDER — TERCONAZOLE 8 MG/G
0.8 CREAM VAGINAL
Qty: 1 | Refills: 3 | Status: DISCONTINUED | COMMUNITY
Start: 2018-12-06 | End: 2021-03-02

## 2021-03-02 RX ORDER — FLUCONAZOLE 200 MG/1
200 TABLET ORAL
Qty: 2 | Refills: 6 | Status: DISCONTINUED | COMMUNITY
Start: 2018-12-17 | End: 2021-03-02

## 2021-03-02 RX ORDER — HYDROXYCHLOROQUINE SULFATE 200 MG/1
200 TABLET, FILM COATED ORAL
Qty: 60 | Refills: 0 | Status: DISCONTINUED | COMMUNITY
Start: 2017-11-15 | End: 2021-03-02

## 2021-03-02 RX ORDER — SULFAMETHOXAZOLE AND TRIMETHOPRIM 800; 160 MG/1; MG/1
800-160 TABLET ORAL
Qty: 10 | Refills: 0 | Status: DISCONTINUED | COMMUNITY
Start: 2021-02-01

## 2021-03-02 RX ORDER — RIMEGEPANT SULFATE 75 MG/75MG
75 TABLET, ORALLY DISINTEGRATING ORAL
Qty: 8 | Refills: 0 | Status: DISCONTINUED | COMMUNITY
Start: 2020-09-11

## 2021-03-02 NOTE — REASON FOR VISIT
[Consultation] : a consultation visit [Hypothyroidism] : hypothyroidism [Pituitary Evaluation/ Disorder] : pituitary evaluation/disorder [FreeTextEntry2] : Conchita CHILD

## 2021-03-02 NOTE — HISTORY OF PRESENT ILLNESS
[FreeTextEntry1] : 41 y.o. female with h/o hypothyroidism diagnosed in 2011 and hyperprolactinemia diagnosed in 2020 presents for evaluation.\par \par Regarding hypothyroidism, she originally reported fatigue, hair loss and nail changes. These symptoms are better now. Taking LT4 50 mcg daily on an empty stomach and not always waiting before eating. Thyroid disease is being managed by her PCP. She is not aware of Hashimoto's disease; however, she does have SLE. No neck complaints now. She does reports dysphagia at times but no neck pain or dysphonia.  She does report weight gain and constipation. Also seeing dermatology for hair loss. \par \par Mother has hypothyroidism. \par \par Regarding hyperprolactinemia, reports normal menstrual cycles and no change in vision . No galactorrhea. Also has chronic migraines and sees neurology. Follows with GYN. Follows with psychiatry for depression, anxiety and insomnia. \par \par In regards to vitamin D def, not taking supplement.

## 2021-03-02 NOTE — ASSESSMENT
[FreeTextEntry1] : 41 y.o. female with h/o hyperprolactinemia, hypothyroidism and vitamin D def.\par \par 1. Hyperprolactinemia- Discussed pathophysiology and reviewed etiology. Given mild serum prolactin elevation, suspect drug related.  Reviewed patient's medication list and diazepam may increase prolactin levels. Also hypothyroidism can increase serum prolactin levels. Will repeat TFTs. Also discussed concept of macroprolactin so will check serum macroprolactin. Lastly discussed possibility of pituitary microadenoma. Recommend rechecking serum prolactin level in the morning on a fast without breast stimulation. Will also check pituitary axis including ACTH with am cortisol, IGF-1 and GH, FSH, LH and estradiol levels. Will check serum HCG to rule out pregnancy. \par \par 2. Hypothyroidism- Discussed pathophysiology and reviewed sign and symptoms. Discussed possibility of autoimmune thyroid disease. Will check TFTs and antithyroid antibodies. Will adjust LT4 accordingly. Discussed proper intake of LT4.\par \par 3. Vitamin D def- Recommend vitamin D3 2,000 IU daily\par \par Follow up in 3 to 4 months

## 2021-03-02 NOTE — PHYSICAL EXAM
[Alert] : alert [No Acute Distress] : no acute distress [Normal Sclera/Conjunctiva] : normal sclera/conjunctiva [EOMI] : extra ocular movement intact [No LAD] : no lymphadenopathy [Thyroid Not Enlarged] : the thyroid was not enlarged [No Thyroid Nodules] : no palpable thyroid nodules [No Respiratory Distress] : no respiratory distress [Clear to Auscultation] : lungs were clear to auscultation bilaterally [Normal S1, S2] : normal S1 and S2 [Regular Rhythm] : with a regular rhythm [No Edema] : no peripheral edema [Normal Bowel Sounds] : normal bowel sounds [Not Tender] : non-tender [Not Distended] : not distended [Soft] : abdomen soft [Normal Anterior Cervical Nodes] : no anterior cervical lymphadenopathy [No Clubbing, Cyanosis] : no clubbing  or cyanosis of the fingernails [No Rash] : no rash [Normal Reflexes] : deep tendon reflexes were 2+ and symmetric [Normal Affect] : the affect was normal [Normal Mood] : the mood was normal [Abdominal Striae] : no abdominal striae [Acanthosis Nigricans] : no acanthosis nigricans

## 2021-03-02 NOTE — REVIEW OF SYSTEMS
[Fatigue] : fatigue [Recent Weight Gain (___ Lbs)] : recent weight gain: [unfilled] lbs [Dysphagia] : dysphagia [Palpitations] : palpitations [Constipation] : constipation [Hirsutism] : hirsutism [Hair Loss] : hair loss [Headaches] : headaches [Anxiety] : anxiety [Polydipsia] : polydipsia [Negative] : Respiratory [Joint Pain] : joint pain [Depression] : depression [Neck Pain] : no neck pain [Dysphonia] : no dysphonia [Chest Pain] : no chest pain [Polyuria] : no polyuria [Irregular Menses] : regular menses [Galactorrhea] : no galactorrhea  [Swelling] : no swelling [FreeTextEntry3] : no change in peripheral vision [FreeTextEntry9] : prior to menses

## 2021-03-15 DIAGNOSIS — R79.89 OTHER SPECIFIED ABNORMAL FINDINGS OF BLOOD CHEMISTRY: ICD-10-CM

## 2021-03-15 LAB
ACTH SER-ACNC: 31 PG/ML
CORTIS SERPL-MCNC: 21.4 UG/DL
ESTRADIOL SERPL-MCNC: 33 PG/ML
FSH SERPL-MCNC: 24.8 IU/L
GH SERPL-MCNC: 0.14 NG/ML
HCG SERPL-MCNC: <1 MIU/ML
IGF-1 INTERP: NORMAL
IGF-I BLD-MCNC: 187 NG/ML
LH SERPL-ACNC: 15.2 IU/L
PROLACTIN SERPL-MCNC: 34.7 NG/ML
T4 FREE SERPL-MCNC: 1.3 NG/DL
THYROGLOB AB SERPL-ACNC: <20 IU/ML
THYROPEROXIDASE AB SERPL IA-ACNC: 31 IU/ML
TSH SERPL-ACNC: 4.56 UIU/ML

## 2021-03-19 ENCOUNTER — APPOINTMENT (OUTPATIENT)
Dept: OBGYN | Facility: CLINIC | Age: 41
End: 2021-03-19

## 2021-03-19 ENCOUNTER — APPOINTMENT (OUTPATIENT)
Dept: OBGYN | Facility: CLINIC | Age: 41
End: 2021-03-19
Payer: COMMERCIAL

## 2021-03-19 ENCOUNTER — NON-APPOINTMENT (OUTPATIENT)
Age: 41
End: 2021-03-19

## 2021-03-19 VITALS
HEIGHT: 62 IN | TEMPERATURE: 97.3 F | DIASTOLIC BLOOD PRESSURE: 76 MMHG | SYSTOLIC BLOOD PRESSURE: 109 MMHG | WEIGHT: 126 LBS | BODY MASS INDEX: 23.19 KG/M2 | HEART RATE: 76 BPM

## 2021-03-19 DIAGNOSIS — Z11.3 ENCOUNTER FOR SCREENING FOR INFECTIONS WITH A PREDOMINANTLY SEXUAL MODE OF TRANSMISSION: ICD-10-CM

## 2021-03-19 DIAGNOSIS — R92.2 INCONCLUSIVE MAMMOGRAM: ICD-10-CM

## 2021-03-19 DIAGNOSIS — Z63.5 DISRUPTION OF FAMILY BY SEPARATION AND DIVORCE: ICD-10-CM

## 2021-03-19 DIAGNOSIS — Z12.39 ENCOUNTER FOR OTHER SCREENING FOR MALIGNANT NEOPLASM OF BREAST: ICD-10-CM

## 2021-03-19 PROCEDURE — 99072 ADDL SUPL MATRL&STAF TM PHE: CPT

## 2021-03-19 PROCEDURE — 99396 PREV VISIT EST AGE 40-64: CPT

## 2021-03-19 RX ORDER — OMEGA-3/DHA/EPA/FISH OIL 300-1000MG
CAPSULE ORAL
Refills: 0 | Status: DISCONTINUED | COMMUNITY
End: 2021-03-19

## 2021-03-19 RX ORDER — CIPROFLOXACIN HYDROCHLORIDE 500 MG/1
500 TABLET, FILM COATED ORAL TWICE DAILY
Qty: 1 | Refills: 0 | Status: DISCONTINUED | COMMUNITY
Start: 2021-02-23 | End: 2021-03-19

## 2021-03-19 SDOH — SOCIAL STABILITY - SOCIAL INSECURITY: DISRUPTION OF FAMILY BY SEPARATION AND DIVORCE: Z63.5

## 2021-03-21 PROBLEM — Z63.5 DIVORCED: Status: ACTIVE | Noted: 2021-03-21

## 2021-03-21 LAB
C TRACH RRNA SPEC QL NAA+PROBE: NOT DETECTED
HBV SURFACE AG SER QL: NONREACTIVE
HCV AB SER QL: NONREACTIVE
HCV S/CO RATIO: 0.08 S/CO
HIV1+2 AB SPEC QL IA.RAPID: NONREACTIVE
HPV HIGH+LOW RISK DNA PNL CVX: NOT DETECTED
N GONORRHOEA RRNA SPEC QL NAA+PROBE: NOT DETECTED
SOURCE TP AMPLIFICATION: NORMAL
T PALLIDUM AB SER QL IA: NEGATIVE

## 2021-03-22 LAB
MONOMERIC PROLACTIN (ICMA)*: 36 NG/ML
PERCENT MACROPROLACTIN: 3 %
PROLACTIN, SERUM (ICMA)*: 37 NG/ML

## 2021-03-24 LAB — CYTOLOGY CVX/VAG DOC THIN PREP: NORMAL

## 2021-04-01 ENCOUNTER — TRANSCRIPTION ENCOUNTER (OUTPATIENT)
Age: 41
End: 2021-04-01

## 2021-04-01 ENCOUNTER — NON-APPOINTMENT (OUTPATIENT)
Age: 41
End: 2021-04-01

## 2021-07-13 ENCOUNTER — APPOINTMENT (OUTPATIENT)
Dept: ENDOCRINOLOGY | Facility: CLINIC | Age: 41
End: 2021-07-13
Payer: COMMERCIAL

## 2021-08-08 ENCOUNTER — RX RENEWAL (OUTPATIENT)
Age: 41
End: 2021-08-08

## 2021-09-05 ENCOUNTER — TRANSCRIPTION ENCOUNTER (OUTPATIENT)
Age: 41
End: 2021-09-05

## 2021-09-06 ENCOUNTER — TRANSCRIPTION ENCOUNTER (OUTPATIENT)
Age: 41
End: 2021-09-06

## 2021-10-11 ENCOUNTER — APPOINTMENT (OUTPATIENT)
Dept: ENDOCRINOLOGY | Facility: CLINIC | Age: 41
End: 2021-10-11
Payer: COMMERCIAL

## 2021-10-11 VITALS
RESPIRATION RATE: 18 BRPM | BODY MASS INDEX: 21.9 KG/M2 | WEIGHT: 119 LBS | OXYGEN SATURATION: 100 % | DIASTOLIC BLOOD PRESSURE: 75 MMHG | SYSTOLIC BLOOD PRESSURE: 109 MMHG | TEMPERATURE: 98.1 F | HEART RATE: 82 BPM | HEIGHT: 62 IN

## 2021-10-11 PROCEDURE — 36415 COLL VENOUS BLD VENIPUNCTURE: CPT

## 2021-10-11 PROCEDURE — 99214 OFFICE O/P EST MOD 30 MIN: CPT | Mod: 25

## 2021-10-11 RX ORDER — BIOTIN 10 MG
TABLET ORAL
Refills: 0 | Status: DISCONTINUED | COMMUNITY
End: 2021-10-11

## 2021-10-11 RX ORDER — PREGABALIN 100 MG/1
100 CAPSULE ORAL
Qty: 60 | Refills: 0 | Status: DISCONTINUED | COMMUNITY
Start: 2021-02-12 | End: 2021-10-11

## 2021-10-11 NOTE — REASON FOR VISIT
[Follow - Up] : a follow-up visit [Hypothyroidism] : hypothyroidism [Pituitary Evaluation/ Disorder] : pituitary evaluation/disorder

## 2021-10-11 NOTE — ASSESSMENT
[FreeTextEntry1] : 41 y.o. female with h/o hyperprolactinemia, hypothyroidism and vitamin D def.\par \par 1. Hyperprolactinemia- Discussed pathophysiology and reviewed etiology. Given mild serum prolactin elevation, suspect drug related.  Reviewed patient's medication list and diazepam may increase prolactin levels. Also hypothyroidism can increase serum prolactin levels. Will repeat TFTs. Had normal serum macroprolactin in March 2021. Unlikely pituitary microadenoma given unremarkable pituitary MRI in March 2021. Had normal pituitary axis including ACTH with am cortisol, IGF-1 and GH, FSH, LH and estradiol levels in March 2021.\par \par 2. Hypothyroidism- Discussed pathophysiology and reviewed sign and symptoms. Discussed possibility of autoimmune thyroid disease. Will check TFTs and antithyroid antibodies. Will adjust LT4 accordingly. Discussed proper intake of LT4.\par \par 3. Vitamin D def- Will check 25 vitamin D level. Recommend vitamin D3 2,000 IU daily\par \par Follow up in 6 months\par Labs drawn in the office today

## 2021-10-11 NOTE — HISTORY OF PRESENT ILLNESS
[FreeTextEntry1] : 41 y.o. female with h/o hypothyroidism diagnosed in 2011 and hyperprolactinemia diagnosed in 2020 presents for follow up visit. C/o right hip pain and seeing pain management. \par \par Regarding hypothyroidism, she originally reported fatigue, hair loss and nail changes which did improve after increasing T4 from 50 mcg daily to 75 mcg daily. These symptoms are better now. Taking LT4 75 mcg daily on an empty stomach and waiting at least 1/2 hour before eating. She is not aware of Hashimoto's disease; however, she does have SLE. No neck complaints now. No \par dysphagia, no neck pain or dysphonia.  She does report weight loss since last visit. Reports eating better. Still dealing with constipation. Also seeing dermatology for hair loss. \par \par Mother has hypothyroidism. \par \par Regarding hyperprolactinemia, reports normal menstrual cycles and no change in vision. No galactorrhea. Also has chronic migraines and sees neurology. Follows with GYN. Follows with psychiatry for depression, anxiety and insomnia. Had unremarkable pituitary MRI in March 2021. \par \par In regards to vitamin D def, not taking supplement.

## 2021-10-11 NOTE — REVIEW OF SYSTEMS
[Fatigue] : fatigue [Recent Weight Loss (___ Lbs)] : recent weight loss: [unfilled] lbs [Palpitations] : palpitations [Constipation] : constipation [Joint Pain] : joint pain [Hirsutism] : hirsutism [Hair Loss] : hair loss [Headaches] : headaches [Depression] : depression [Anxiety] : anxiety [Negative] : Respiratory [Recent Weight Gain (___ Lbs)] : no recent weight gain [Dysphagia] : no dysphagia [Neck Pain] : no neck pain [Dysphonia] : no dysphonia [Chest Pain] : no chest pain [Polyuria] : no polyuria [Irregular Menses] : regular menses [Polydipsia] : no polydipsia [Galactorrhea] : no galactorrhea  [Swelling] : no swelling [FreeTextEntry3] : no change in peripheral vision

## 2021-10-12 LAB
25(OH)D3 SERPL-MCNC: 24.6 NG/ML
ALBUMIN SERPL ELPH-MCNC: 4.7 G/DL
ALP BLD-CCNC: 49 U/L
ALT SERPL-CCNC: 26 U/L
ANION GAP SERPL CALC-SCNC: 13 MMOL/L
AST SERPL-CCNC: 27 U/L
BASOPHILS # BLD AUTO: 0.03 K/UL
BASOPHILS NFR BLD AUTO: 0.5 %
BILIRUB SERPL-MCNC: 0.2 MG/DL
BUN SERPL-MCNC: 22 MG/DL
CALCIUM SERPL-MCNC: 9.7 MG/DL
CHLORIDE SERPL-SCNC: 107 MMOL/L
CO2 SERPL-SCNC: 19 MMOL/L
CREAT SERPL-MCNC: 0.98 MG/DL
EOSINOPHIL # BLD AUTO: 0.12 K/UL
EOSINOPHIL NFR BLD AUTO: 2.1 %
ESTRADIOL SERPL-MCNC: 109 PG/ML
FOLATE SERPL-MCNC: >20 NG/ML
FSH SERPL-MCNC: 5.9 IU/L
GLUCOSE SERPL-MCNC: 90 MG/DL
HCT VFR BLD CALC: 41.7 %
HGB BLD-MCNC: 13.8 G/DL
IMM GRANULOCYTES NFR BLD AUTO: 0.3 %
LH SERPL-ACNC: 7.1 IU/L
LYMPHOCYTES # BLD AUTO: 1.77 K/UL
LYMPHOCYTES NFR BLD AUTO: 30.8 %
MAN DIFF?: NORMAL
MCHC RBC-ENTMCNC: 33.1 GM/DL
MCHC RBC-ENTMCNC: 33.4 PG
MCV RBC AUTO: 101 FL
MONOCYTES # BLD AUTO: 0.48 K/UL
MONOCYTES NFR BLD AUTO: 8.4 %
NEUTROPHILS # BLD AUTO: 3.32 K/UL
NEUTROPHILS NFR BLD AUTO: 57.9 %
PLATELET # BLD AUTO: 285 K/UL
POTASSIUM SERPL-SCNC: 3.8 MMOL/L
PROLACTIN SERPL-MCNC: 21.5 NG/ML
PROT SERPL-MCNC: 7.1 G/DL
RBC # BLD: 4.13 M/UL
RBC # FLD: 14.6 %
SODIUM SERPL-SCNC: 139 MMOL/L
T4 FREE SERPL-MCNC: 1.6 NG/DL
THYROGLOB AB SERPL-ACNC: <20 IU/ML
THYROPEROXIDASE AB SERPL IA-ACNC: <10 IU/ML
TSH SERPL-ACNC: 1.13 UIU/ML
VIT B12 SERPL-MCNC: 602 PG/ML
WBC # FLD AUTO: 5.74 K/UL

## 2022-01-14 ENCOUNTER — NON-APPOINTMENT (OUTPATIENT)
Age: 42
End: 2022-01-14

## 2022-02-04 ENCOUNTER — APPOINTMENT (OUTPATIENT)
Dept: SURGERY | Facility: CLINIC | Age: 42
End: 2022-02-04
Payer: COMMERCIAL

## 2022-02-04 VITALS
HEART RATE: 88 BPM | BODY MASS INDEX: 22.45 KG/M2 | TEMPERATURE: 97.2 F | OXYGEN SATURATION: 98 % | DIASTOLIC BLOOD PRESSURE: 90 MMHG | RESPIRATION RATE: 16 BRPM | SYSTOLIC BLOOD PRESSURE: 135 MMHG | HEIGHT: 62 IN | WEIGHT: 122 LBS

## 2022-02-04 DIAGNOSIS — Z82.0 FAMILY HISTORY OF EPILEPSY AND OTHER DISEASES OF THE NERVOUS SYSTEM: ICD-10-CM

## 2022-02-04 DIAGNOSIS — K64.8 OTHER HEMORRHOIDS: ICD-10-CM

## 2022-02-04 PROCEDURE — 99213 OFFICE O/P EST LOW 20 MIN: CPT | Mod: 25

## 2022-02-04 PROCEDURE — 46221 LIGATION OF HEMORRHOID(S): CPT

## 2022-02-04 RX ORDER — CHROMIUM 200 MCG
TABLET ORAL
Refills: 0 | Status: DISCONTINUED | COMMUNITY
End: 2022-02-04

## 2022-02-04 RX ORDER — FLUCONAZOLE 150 MG/1
150 TABLET ORAL
Qty: 2 | Refills: 3 | Status: DISCONTINUED | COMMUNITY
Start: 2021-02-23 | End: 2022-02-04

## 2022-02-04 NOTE — PHYSICAL EXAM
[Normal Breath Sounds] : Normal breath sounds [Normal Heart Sounds] : normal heart sounds [No Rash or Lesion] : No rash or lesion [Alert] : alert [Oriented to Person] : oriented to person [Oriented to Place] : oriented to place [Oriented to Time] : oriented to time [Abdomen Masses] : No abdominal masses [Abdomen Tenderness] : ~T No ~M abdominal tenderness [No HSM] : no hepatosplenomegaly [JVD] : no jugular venous distention  [de-identified] : Perianal inspection digital rectal examination and anoscopy reveal no evidence of fissure fistula or abscess.  There are no enlarged external hemorrhoids.  There is no perianal skin breakdown.  Anoscopy reveals enlarged friable circumferential internal hemorrhoids largest in the left lateral and right posterior positions.  Rubber band ligations were performed in these 2 locations after the risks benefits and alternatives including the rare complications of bleeding and infection were explained.  Distal rectal mucosa is otherwise normal.  She tolerated the procedure well and a post procedure instruction sheet was given. [de-identified] : WNL [de-identified] : WNL [de-identified] : ROM WNL

## 2022-02-04 NOTE — CONSULT LETTER
[Dear  ___] : Dear  [unfilled], [Consult Letter:] : I had the pleasure of evaluating your patient, [unfilled]. [Please see my note below.] : Please see my note below. [Consult Closing:] : Thank you very much for allowing me to participate in the care of this patient.  If you have any questions, please do not hesitate to contact me. [Sincerely,] : Sincerely, [FreeTextEntry3] : Kaden Almanza M.D., F.A.C.S, F.A.S.C.R.S [DrSumeet  ___] : Dr. BABB

## 2022-02-04 NOTE — HISTORY OF PRESENT ILLNESS
[FreeTextEntry1] : Bere is a 42 year old female here for consultation. \par \par s/p excision of symptomatic enlarged anterior anal tag 10/30/18. \par \par Colonoscopy 1/17/22 by Dr. Chris Clarke- internal hemorrhoids, normal mucosa to the cecum and distal ileum. \par \par Last seen 10/30/18- Perianal inspection reveals a tender anterior midline edematous anal tag. No thrombosed external hemorrhoids. There is no fissure fistula or abscess. Digital rectal examination and anoscopy are normal except for mildly enlarged nonbleeding internal hemorrhoids. I explained the treatment options for her symptomatic anal tag would be conservative treatment with hydrocortisone cream versus excision in the office. The patient would like to have this excised. I explained the risks benefits and alternatives including the risks of bleeding infection fissure and persistent pain. The area was infiltrated with 5 cc of half percent Marcaine with 1% lidocaine and under sterile conditions the enlarged external tag was excised and sent to pathology for permanent examination. Hemostasis was achieved with Monsel's solution. She tolerated the procedure well. \par In summary the patient had a symptomatic enlarged anterior anal tag likely a result of her change in bowel habits. There is no fissure fistula or abscess. She also has a history of rectal bleeding. The external tag was excised in the office. She will follow up with Dr. Clarke for colorectal cancer screening. \par \par Family hx of colon cancer; father diagnosed at age 55.\par Today pt reports pain 8/10. 1 liquid BM daily/ every other day, takes Linzess daily and Dulcolax as needed. Pain with BMs, itchy, BRB dripping into toilet and on toilet paper with BMs and when urinating for the last 3 months. Denies prolapsing tissue. Occasional leakage of stool when sleeping. Good appetite, no nausea, no vomiting. Denies fever and chills. Not on anticoagulants.

## 2022-02-04 NOTE — ASSESSMENT
[FreeTextEntry1] : In summary the patient has symptomatic bleeding internal hemorrhoids.  Rubber band ligations were performed in the office today.  She tolerated the procedure well.  I instructed her to follow-up for additional rubber band ligations if her bleeding recurs or persists.  Otherwise I only need to see her as needed.

## 2022-03-07 ENCOUNTER — APPOINTMENT (OUTPATIENT)
Dept: RHEUMATOLOGY | Facility: CLINIC | Age: 42
End: 2022-03-07
Payer: COMMERCIAL

## 2022-03-07 VITALS — SYSTOLIC BLOOD PRESSURE: 104 MMHG | DIASTOLIC BLOOD PRESSURE: 72 MMHG | RESPIRATION RATE: 15 BRPM | HEART RATE: 81 BPM

## 2022-03-07 DIAGNOSIS — M32.9 SYSTEMIC LUPUS ERYTHEMATOSUS, UNSPECIFIED: ICD-10-CM

## 2022-03-07 DIAGNOSIS — M25.50 PAIN IN UNSPECIFIED JOINT: ICD-10-CM

## 2022-03-07 DIAGNOSIS — M79.10 MYALGIA, UNSPECIFIED SITE: ICD-10-CM

## 2022-03-07 PROCEDURE — 99214 OFFICE O/P EST MOD 30 MIN: CPT

## 2022-04-12 ENCOUNTER — APPOINTMENT (OUTPATIENT)
Dept: ENDOCRINOLOGY | Facility: CLINIC | Age: 42
End: 2022-04-12
Payer: COMMERCIAL

## 2022-04-12 VITALS
HEART RATE: 78 BPM | DIASTOLIC BLOOD PRESSURE: 84 MMHG | TEMPERATURE: 98.3 F | OXYGEN SATURATION: 99 % | SYSTOLIC BLOOD PRESSURE: 116 MMHG | BODY MASS INDEX: 22.98 KG/M2 | WEIGHT: 124.9 LBS | HEIGHT: 62 IN

## 2022-04-12 PROCEDURE — 99214 OFFICE O/P EST MOD 30 MIN: CPT | Mod: 25

## 2022-04-12 PROCEDURE — 36415 COLL VENOUS BLD VENIPUNCTURE: CPT

## 2022-04-12 NOTE — HISTORY OF PRESENT ILLNESS
[FreeTextEntry1] : 42 y.o. female with h/o hypothyroidism diagnosed in 2011 and hyperprolactinemia diagnosed in 2020 presents for follow up visit. C/o right hip pain and was seeing pain management. \par \par Regarding hypothyroidism, she originally reported fatigue, hair loss and nail changes which did improve after increasing T4 from 50 mcg daily to 75 mcg daily. These symptoms are still present now. She feels fatigue is worse. Taking LT4 75 mcg daily on an empty stomach and waiting at least 1/2 hour before eating. Antithyroid antibodies are negative; however, she does have SLE. No neck complaints now. No dysphagia, no neck pain or dysphonia.  She does report weight is stable since last visit but frustrated since has been eating better. No exercise because of busy work schedule. Still dealing with constipation and had hemorrhoid procedure in 2/2022. Also seeing dermatology for hair loss. \par \par Mother has hypothyroidism. \par \par Regarding hyperprolactinemia, reports normal menstrual cycles and no change in vision. No galactorrhea. Also has chronic migraines and sees neurology. Follows with GYN. Follows with psychiatry every 3 months for depression, anxiety and insomnia. Had unremarkable pituitary MRI in March 2021. \par \par In regards to vitamin D def, not taking supplement.

## 2022-04-12 NOTE — ASSESSMENT
[FreeTextEntry1] : 42 y.o. female with h/o hyperprolactinemia, hypothyroidism and vitamin D def.\par \par 1. Hyperprolactinemia- Discussed pathophysiology and reviewed etiology. Given mild serum prolactin elevation, suspect medication related.  Reviewed patient's medication list and diazepam may increase prolactin levels. Also hypothyroidism can increase serum prolactin levels. Will repeat TFTs and serum prolactin level. Had normal serum macroprolactin in March 2021. Unlikely pituitary microadenoma given unremarkable pituitary MRI in March 2021. Had normal pituitary axis including ACTH with am cortisol, IGF-1 and GH, FSH, LH and estradiol levels in March 2021.\par \par 2. Hypothyroidism- Discussed pathophysiology and reviewed sign and symptoms. Discussed possibility of autoimmune thyroid disease. Will check TFTs. Had normal antithyroid antibodies. Will adjust LT4 accordingly.\par \par 3. Vitamin D def- Will check 25 vitamin D level and supplement accordingly\par \par Follow up in 6 months\par Labs drawn in the office today

## 2022-04-12 NOTE — REVIEW OF SYSTEMS
[Fatigue] : fatigue [Palpitations] : palpitations [Constipation] : constipation [Joint Pain] : joint pain [Hirsutism] : hirsutism [Hair Loss] : hair loss [Headaches] : headaches [Depression] : depression [Anxiety] : anxiety [Negative] : Respiratory [Recent Weight Gain (___ Lbs)] : no recent weight gain [Recent Weight Loss (___ Lbs)] : no recent weight loss [Dysphagia] : no dysphagia [Neck Pain] : no neck pain [Dysphonia] : no dysphonia [Chest Pain] : no chest pain [Polyuria] : no polyuria [Irregular Menses] : regular menses [Polydipsia] : no polydipsia [Galactorrhea] : no galactorrhea  [Swelling] : no swelling [FreeTextEntry3] : no change in peripheral vision

## 2022-04-14 LAB
25(OH)D3 SERPL-MCNC: 27.2 NG/ML
ALBUMIN SERPL ELPH-MCNC: 4.7 G/DL
ALP BLD-CCNC: 53 U/L
ALT SERPL-CCNC: 29 U/L
ANION GAP SERPL CALC-SCNC: 11 MMOL/L
AST SERPL-CCNC: 28 U/L
BILIRUB SERPL-MCNC: 0.2 MG/DL
BUN SERPL-MCNC: 39 MG/DL
CALCIUM SERPL-MCNC: 9.5 MG/DL
CHLORIDE SERPL-SCNC: 105 MMOL/L
CO2 SERPL-SCNC: 21 MMOL/L
CREAT SERPL-MCNC: 1.05 MG/DL
EGFR: 68 ML/MIN/1.73M2
ESTRADIOL SERPL-MCNC: 141 PG/ML
FSH SERPL-MCNC: 10.2 IU/L
GLUCOSE SERPL-MCNC: 95 MG/DL
LH SERPL-ACNC: 11.7 IU/L
POTASSIUM SERPL-SCNC: 5.2 MMOL/L
PROLACTIN SERPL-MCNC: 21.4 NG/ML
PROT SERPL-MCNC: 7.2 G/DL
SODIUM SERPL-SCNC: 137 MMOL/L
T4 FREE SERPL-MCNC: 1.6 NG/DL
TSH SERPL-ACNC: 3.23 UIU/ML

## 2022-04-16 NOTE — PAST MEDICAL HISTORY
[Menstruating] : The patient is menstruating [Regular Cycle Intervals] : have been regular [Total Preg ___] : G[unfilled] [Live Births ___] : P[unfilled]  There is 1 Wet Read(s) to document. There are no Wet Read(s) to document.

## 2022-10-09 ENCOUNTER — NON-APPOINTMENT (OUTPATIENT)
Age: 42
End: 2022-10-09

## 2022-10-10 ENCOUNTER — APPOINTMENT (OUTPATIENT)
Dept: OBGYN | Facility: CLINIC | Age: 42
End: 2022-10-10

## 2022-10-10 ENCOUNTER — ASOB RESULT (OUTPATIENT)
Age: 42
End: 2022-10-10

## 2022-10-10 ENCOUNTER — NON-APPOINTMENT (OUTPATIENT)
Age: 42
End: 2022-10-10

## 2022-10-10 ENCOUNTER — APPOINTMENT (OUTPATIENT)
Dept: ENDOCRINOLOGY | Facility: CLINIC | Age: 42
End: 2022-10-10

## 2022-10-10 VITALS
HEIGHT: 62 IN | RESPIRATION RATE: 16 BRPM | BODY MASS INDEX: 23.19 KG/M2 | TEMPERATURE: 97.9 F | OXYGEN SATURATION: 100 % | SYSTOLIC BLOOD PRESSURE: 108 MMHG | WEIGHT: 126 LBS | HEART RATE: 73 BPM | DIASTOLIC BLOOD PRESSURE: 71 MMHG

## 2022-10-10 DIAGNOSIS — N92.6 IRREGULAR MENSTRUATION, UNSPECIFIED: ICD-10-CM

## 2022-10-10 PROCEDURE — 99214 OFFICE O/P EST MOD 30 MIN: CPT | Mod: 25

## 2022-10-10 PROCEDURE — 76830 TRANSVAGINAL US NON-OB: CPT

## 2022-10-10 PROCEDURE — 36415 COLL VENOUS BLD VENIPUNCTURE: CPT

## 2022-10-10 NOTE — HISTORY OF PRESENT ILLNESS
[FreeTextEntry1] : 42 y.o. female with h/o hypothyroidism diagnosed in 2011 and hyperprolactinemia diagnosed in 2020 presents for follow up visit. C/o right hip pain and was seeing pain management. Reports right 4th toe stress fracture (5/31/22) that hasn't healed yet, seeing podiatry and received cortisone shots in foot.\par \par Regarding hypothyroidism, she originally reported fatigue, hair loss and nail changes which did improve after increasing T4 from 50 mcg daily to 75 mcg daily. These symptoms are still present now. She feels fatigue and hair loss is worse (diagnosed with alopecia in 2011 per pt). Now reports irritation and cold intolerance. Also reports night sweats soaking through clothes for past 2 months. Taking LT4 88 mcg daily on an empty stomach and waiting at least 1/2 hour before eating (increased from 75 mcg qd in 4/2022). Antithyroid antibodies are negative; however, she does have SLE. No neck complaints now. No dysphagia, no neck pain or dysphonia.  She does report weight is stable since last visit but frustrated since has been eating better. No exercise because of busy work schedule. Still dealing with constipation and had hemorrhoid procedure in 2/2022, still taking Linzess and OTC medication for worsened constipation. Also seeing dermatology for hair loss. \par \par Mother has hypothyroidism. \par \par Regarding hyperprolactinemia, reports normal menstrual cycles usually, however has been spotting and bleeding for past 5 days intermenstrually which has not occurred before. LMP: 9/28/2022. Had unremarkable TVUS with GYN today. Denies change in vision. No galactorrhea. Also has chronic migraines and sees neurology. Follows with GYN. Follows with psychiatry every 3 months for depression, anxiety and insomnia. Had unremarkable pituitary MRI in March 2021. \par \par In regards to vitamin D def, taking vitamin D 2000 IU consistently now. \par \par She has discontinued amitriptyline.

## 2022-10-10 NOTE — PHYSICAL EXAM
[Alert] : alert [No Acute Distress] : no acute distress [Normal Sclera/Conjunctiva] : normal sclera/conjunctiva [EOMI] : extra ocular movement intact [No LAD] : no lymphadenopathy [Thyroid Not Enlarged] : the thyroid was not enlarged [No Thyroid Nodules] : no palpable thyroid nodules [No Respiratory Distress] : no respiratory distress [Clear to Auscultation] : lungs were clear to auscultation bilaterally [Normal S1, S2] : normal S1 and S2 [Regular Rhythm] : with a regular rhythm [No Edema] : no peripheral edema [Normal Bowel Sounds] : normal bowel sounds [Not Tender] : non-tender [Not Distended] : not distended [Soft] : abdomen soft [Normal Anterior Cervical Nodes] : no anterior cervical lymphadenopathy [No Clubbing, Cyanosis] : no clubbing  or cyanosis of the fingernails [No Rash] : no rash [Normal Reflexes] : deep tendon reflexes were 2+ and symmetric [Normal Affect] : the affect was normal [Normal Mood] : the mood was normal [Abdominal Striae] : no abdominal striae [Acanthosis Nigricans] : no acanthosis nigricans [de-identified] : mild hand tremors bilaterally

## 2022-10-10 NOTE — END OF VISIT
[FreeTextEntry3] : Patient seen and examined with our NP fellow. I agree with the above findings and assessment/plan.  [Time Spent: ___ minutes] : I have spent [unfilled] minutes of time on the encounter.

## 2022-10-10 NOTE — ASSESSMENT
[FreeTextEntry1] : 42 y.o. female with h/o hyperprolactinemia, hypothyroidism and vitamin D def.\par \par 1. Hyperprolactinemia- Discussed pathophysiology and reviewed etiology. Given mild serum prolactin elevation, suspect medication related.  Reviewed patient's medication list and diazepam may increase prolactin levels. Also hypothyroidism can increase serum prolactin levels. Will repeat TFTs and serum prolactin level. Had normal serum macroprolactin in March 2021. Unlikely pituitary microadenoma given unremarkable pituitary MRI in March 2021. Had normal pituitary axis including ACTH with am cortisol, IGF-1 and GH, FSH, LH and estradiol levels in March 2021.\par \par 2. Hypothyroidism- Discussed pathophysiology and reviewed sign and symptoms. Discussed possibility of autoimmune thyroid disease. Will check TFTs. Had normal antithyroid antibodies. Will adjust LT4 accordingly.\par \par 3. Vitamin D def- Will check 25 vitamin D level and supplement accordingly\par \par 4. Irregular Menses and Night Sweats- Discussed possible etiologies. Will check TFTs, LH, FSH, estradiol, prolactin, DHEAS, total testosterone, CMP, CBC with iron studies, B12 and folate, and vitamin D. Advised f/u with GYN, rheumatologist, and establish care with PCP.\par \par 5. Alopecia- Will check TFTs. Will check CBC with ferritin and iron studies. Will check androgens including DHEAS and testosterone. May need to follow up with dermatology again if lab work is normal. \par \par Follow up in 6 months\par Labs drawn in the office today\par \par Seen and d/w Dr. Mason.\par Nicole Vizcarra NP (Brenda)  Endocrine Fellow

## 2022-10-10 NOTE — REVIEW OF SYSTEMS
[Fatigue] : fatigue [Palpitations] : palpitations [Constipation] : constipation [Joint Pain] : joint pain [Hirsutism] : hirsutism [Hair Loss] : hair loss [Headaches] : headaches [Depression] : depression [Anxiety] : anxiety [Negative] : Respiratory [As Noted in HPI] : as noted in HPI [Cold Intolerance] : cold intolerance [Recent Weight Gain (___ Lbs)] : no recent weight gain [Recent Weight Loss (___ Lbs)] : no recent weight loss [Dysphagia] : no dysphagia [Neck Pain] : no neck pain [Dysphonia] : no dysphonia [Chest Pain] : no chest pain [Polyuria] : no polyuria [Polydipsia] : no polydipsia [Galactorrhea] : no galactorrhea  [Swelling] : no swelling [FreeTextEntry2] : night sweats [FreeTextEntry3] : no change in peripheral vision [FreeTextEntry8] : one episode of intermenstrual bleeding [de-identified] : irritation

## 2022-10-12 LAB
25(OH)D3 SERPL-MCNC: 32.2 NG/ML
ALBUMIN SERPL ELPH-MCNC: 4.1 G/DL
ALP BLD-CCNC: 46 U/L
ALT SERPL-CCNC: 19 U/L
ANION GAP SERPL CALC-SCNC: 11 MMOL/L
AST SERPL-CCNC: 20 U/L
BASOPHILS # BLD AUTO: 0.05 K/UL
BASOPHILS NFR BLD AUTO: 0.8 %
BILIRUB SERPL-MCNC: 0.2 MG/DL
BUN SERPL-MCNC: 22 MG/DL
CALCIUM SERPL-MCNC: 9.4 MG/DL
CHLORIDE SERPL-SCNC: 110 MMOL/L
CO2 SERPL-SCNC: 19 MMOL/L
CREAT SERPL-MCNC: 0.86 MG/DL
DHEA-S SERPL-MCNC: 85.2 UG/DL
EGFR: 87 ML/MIN/1.73M2
EOSINOPHIL # BLD AUTO: 0.15 K/UL
EOSINOPHIL NFR BLD AUTO: 2.3 %
ESTRADIOL SERPL-MCNC: 54 PG/ML
FERRITIN SERPL-MCNC: 26 NG/ML
FOLATE SERPL-MCNC: >20 NG/ML
FSH SERPL-MCNC: 12.2 IU/L
GLUCOSE SERPL-MCNC: 80 MG/DL
HCT VFR BLD CALC: 38.6 %
HGB BLD-MCNC: 13.4 G/DL
IMM GRANULOCYTES NFR BLD AUTO: 0.3 %
IRON SATN MFR SERPL: 22 %
IRON SERPL-MCNC: 76 UG/DL
LH SERPL-ACNC: 6.2 IU/L
LYMPHOCYTES # BLD AUTO: 2.24 K/UL
LYMPHOCYTES NFR BLD AUTO: 34 %
MAN DIFF?: NORMAL
MCHC RBC-ENTMCNC: 34.7 GM/DL
MCHC RBC-ENTMCNC: 36 PG
MCV RBC AUTO: 103.8 FL
MONOCYTES # BLD AUTO: 0.45 K/UL
MONOCYTES NFR BLD AUTO: 6.8 %
NEUTROPHILS # BLD AUTO: 3.68 K/UL
NEUTROPHILS NFR BLD AUTO: 55.8 %
PLATELET # BLD AUTO: 263 K/UL
POTASSIUM SERPL-SCNC: 4 MMOL/L
PROLACTIN SERPL-MCNC: 27.5 NG/ML
PROT SERPL-MCNC: 6.3 G/DL
RBC # BLD: 3.72 M/UL
RBC # FLD: 12.6 %
SODIUM SERPL-SCNC: 140 MMOL/L
T4 FREE SERPL-MCNC: 1.6 NG/DL
TESTOST SERPL-MCNC: <2.5 NG/DL
TIBC SERPL-MCNC: 343 UG/DL
TSH SERPL-ACNC: 1.07 UIU/ML
UIBC SERPL-MCNC: 268 UG/DL
VIT B12 SERPL-MCNC: 527 PG/ML
WBC # FLD AUTO: 6.59 K/UL

## 2022-10-14 ENCOUNTER — NON-APPOINTMENT (OUTPATIENT)
Age: 42
End: 2022-10-14

## 2022-10-17 ENCOUNTER — APPOINTMENT (OUTPATIENT)
Dept: OBGYN | Facility: CLINIC | Age: 42
End: 2022-10-17

## 2022-10-17 DIAGNOSIS — Z87.42 PERSONAL HISTORY OF OTHER DISEASES OF THE FEMALE GENITAL TRACT: ICD-10-CM

## 2022-10-17 DIAGNOSIS — N92.6 IRREGULAR MENSTRUATION, UNSPECIFIED: ICD-10-CM

## 2022-10-17 DIAGNOSIS — N94.6 DYSMENORRHEA, UNSPECIFIED: ICD-10-CM

## 2022-10-17 DIAGNOSIS — N93.9 ABNORMAL UTERINE AND VAGINAL BLEEDING, UNSPECIFIED: ICD-10-CM

## 2022-10-17 PROCEDURE — 99213 OFFICE O/P EST LOW 20 MIN: CPT | Mod: 95

## 2022-10-17 NOTE — DISCHARGE NOTE PROVIDER - [id number]_CARD_ALL_CORE
Pt here for left knee injury  Per mom, Germaine Goodell was serving a volleyball and her knee went out and she fell on it and was in extreme pain. When she got in the car, she passed out for like 5 seconds and when she came to she said she was hearing ringing. She can't put any weight on it and it's swollen. \"  No meds PTA.     Pt presents alert, orientedx4, easy WOB, c/o no pain to knee at rest but pain 7/10 with weightbearing  +swelling noted to left knee  CSM and pulses intact
No

## 2022-10-17 NOTE — REASON FOR VISIT
[Home] : at home, [unfilled] , at the time of the visit. [Other Location: e.g. Home (Enter Location, City,State)___] : at [unfilled] [Patient] : the patient [Follow-Up] : a follow-up evaluation of

## 2022-10-20 ENCOUNTER — NON-APPOINTMENT (OUTPATIENT)
Age: 42
End: 2022-10-20

## 2022-11-16 NOTE — H&P ADULT - SKIN/BREAST
Nosebleeds Normal Treatment: I explained this is common when taking isotretinoin. I recommended saline mist in each nostril multiple times a day. If this worsens they will contact us. details…

## 2022-11-20 ENCOUNTER — NON-APPOINTMENT (OUTPATIENT)
Age: 42
End: 2022-11-20

## 2022-11-21 ENCOUNTER — TRANSCRIPTION ENCOUNTER (OUTPATIENT)
Age: 42
End: 2022-11-21

## 2022-11-23 ENCOUNTER — OUTPATIENT (OUTPATIENT)
Dept: OUTPATIENT SERVICES | Facility: HOSPITAL | Age: 42
LOS: 1 days | End: 2022-11-23

## 2022-11-23 ENCOUNTER — APPOINTMENT (OUTPATIENT)
Dept: DISASTER EMERGENCY | Facility: HOSPITAL | Age: 42
End: 2022-11-23

## 2022-11-23 VITALS
DIASTOLIC BLOOD PRESSURE: 71 MMHG | TEMPERATURE: 99 F | HEIGHT: 62 IN | OXYGEN SATURATION: 100 % | SYSTOLIC BLOOD PRESSURE: 106 MMHG | RESPIRATION RATE: 18 BRPM | HEART RATE: 70 BPM | WEIGHT: 125 LBS

## 2022-11-23 VITALS
OXYGEN SATURATION: 100 % | RESPIRATION RATE: 18 BRPM | TEMPERATURE: 99 F | HEART RATE: 67 BPM | DIASTOLIC BLOOD PRESSURE: 60 MMHG | SYSTOLIC BLOOD PRESSURE: 95 MMHG

## 2022-11-23 DIAGNOSIS — U07.1 COVID-19: ICD-10-CM

## 2022-11-23 RX ORDER — BEBTELOVIMAB 87.5 MG/ML
175 INJECTION, SOLUTION INTRAVENOUS ONCE
Refills: 0 | Status: COMPLETED | OUTPATIENT
Start: 2022-11-23 | End: 2022-11-23

## 2022-11-23 RX ADMIN — BEBTELOVIMAB 175 MILLIGRAM(S): 87.5 INJECTION, SOLUTION INTRAVENOUS at 09:15

## 2022-11-23 NOTE — MONOCLONAL ANTIBODY INFUSION - HOME MEDICATIONS
benzonatate 100 mg oral capsule , 1 cap(s) orally every 8 hours, As needed, Cough  guaiFENesin 100 mg/5 mL oral liquid , 5 milliliter(s) orally every 6 hours, As needed, Cough  Levaquin 500 mg oral tablet , 1 tab(s) orally once a day   albuterol 90 mcg/inh inhalation aerosol , 2 puff(s) inhaled every 6 hours, As needed, Shortness of Breath and/or Wheezing  diazePAM 10 mg oral tablet , 4 tab(s) orally once a day (at bedtime), As Needed for insomnia  propranolol 80 mg oral capsule, extended release , 1 cap(s) orally 2 times a day  levothyroxine 50 mcg (0.05 mg) oral tablet , 1 tab(s) orally once a day  Ambien CR 12.5 mg oral tablet, extended release , 1 tab(s) orally once a day (at bedtime)  gabapentin 600 mg oral tablet , 2 tab(s) orally once a day (at bedtime)  Plaquenil 200 mg oral tablet , 1 tab(s) orally 2 times a day

## 2022-11-23 NOTE — MONOCLONAL ANTIBODY INFUSION - ASSESSMENT AND PLAN
This is a 42 yrs old female with PMHx of Lupus, Pneumonia after covid in 2020, chronic migraine and insomnia and recently diagnosed with Covid-19 infection who was referred for monoclonal antibody infusion by provider in urgent care after testing positive for COVID 19 on 11/21/22.  Patient states he has been experiencing HA, cough, sore throat, malaise, and nasal congestion since 11/20/22. He denies any CP, SOB, chills, numbness/tingling in b/l limbs, loss of sensation or motor function, N/V/D. Pt is vaccinated and boosted with pfizer.  PLAN:  - Injection procedure explained to patient   - Consent for monoclonal antibody injection obtained   - Risk & benefits discussed/all questions answered  - Inject Bebtelovimab 175 mg over 1 minute.  - Observe patient for one hour post administration    I have reviewed the Bebtelovimab Emergency Use Authorization (EUA) and I have provided the patient or patient's caregiver with the following information:    1. FDA has authorized emergency use Bebtelovimab, which is not an FDA-approved biological product.  2. The patient or patient's caregiver has the option to accept or refuse administration of Bebtelovimab.  3. The significant known and potential risks and benefits of Bebtelovimab and the extent to which such risks and benefits are unknown.  4. Information on available alternative treatments and risks and benefits of those alternatives.    The patient's COVID monoclonal antibody injection administration went well without any complications. The patient tolerated the treatment without any reactions. Vitals were stable throughout the injection & post-injection administration. The pt denies any CP, fevers, chills, SOB, numbness/tingling in b/l limbs, loss of sensation or motor function, N/V/D while receiving the injection. Patient denies any symptoms an hour after post injection. Vitals were taken post injection and were stable. Pt is medically stable to be discharged home. Discharge instructions were provided to the patient with a fact sheet included. Patient was instructed to self-isolate and use infection control measures (e.g wear mask, isolate, social distance, avoid sharing personal items, clean and disinfect "high touch" surfaces, and frequent handwashing according to the CDC guidelines. The patient was informed on what symptoms to be aware of for the next couple of days, and if there are any issues to call the 24/7 clinical call center. Patient was instructed to follow up with PCP as needed.

## 2022-11-23 NOTE — MONOCLONAL ANTIBODY INFUSION - EXAM
CC: Monoclonal Antibody Infusion/COVID 19 Positive  42yFemale    exam/findings:  T(C): 37.1 (11-23-22 @ 09:04), Max: 37.1 (11-23-22 @ 09:04)  HR: 70 (11-23-22 @ 09:04) (70 - 70)  BP: 106/71 (11-23-22 @ 09:04) (106/71 - 106/71)  RR: 18 (11-23-22 @ 09:04) (18 - 18)  SpO2: 100% (11-23-22 @ 09:04) (100% - 100%)      PE:   Appearance: NAD	  HEENT:   Normal oral mucosa,   Lymphatic: No lymphadenopathy  Cardiovascular: Normal S1 S2, No JVD, No murmurs, No edema  Respiratory: Lungs clear to auscultation	  Gastrointestinal:  Soft, Non-tender, + BS	  Skin: warm and dry  Neurologic: Non-focal  Extremities: Normal range of motion,

## 2022-11-26 ENCOUNTER — NON-APPOINTMENT (OUTPATIENT)
Age: 42
End: 2022-11-26

## 2022-12-03 ENCOUNTER — NON-APPOINTMENT (OUTPATIENT)
Age: 42
End: 2022-12-03

## 2022-12-08 ENCOUNTER — RX RENEWAL (OUTPATIENT)
Age: 42
End: 2022-12-08

## 2022-12-08 RX ORDER — HYDROXYCHLOROQUINE SULFATE 200 MG/1
200 TABLET, FILM COATED ORAL
Qty: 180 | Refills: 0 | Status: ACTIVE | COMMUNITY
Start: 2019-07-13 | End: 1900-01-01

## 2022-12-13 ENCOUNTER — NON-APPOINTMENT (OUTPATIENT)
Age: 42
End: 2022-12-13

## 2023-01-23 ENCOUNTER — TRANSCRIPTION ENCOUNTER (OUTPATIENT)
Age: 43
End: 2023-01-23

## 2023-01-27 ENCOUNTER — APPOINTMENT (OUTPATIENT)
Dept: OBGYN | Facility: CLINIC | Age: 43
End: 2023-01-27
Payer: COMMERCIAL

## 2023-01-27 VITALS
SYSTOLIC BLOOD PRESSURE: 129 MMHG | HEIGHT: 62 IN | HEART RATE: 75 BPM | BODY MASS INDEX: 23.19 KG/M2 | WEIGHT: 126 LBS | DIASTOLIC BLOOD PRESSURE: 87 MMHG

## 2023-01-27 PROCEDURE — 99396 PREV VISIT EST AGE 40-64: CPT

## 2023-01-27 RX ORDER — TOPIRAMATE 25 MG/1
25 TABLET, FILM COATED ORAL
Qty: 90 | Refills: 0 | Status: COMPLETED | COMMUNITY
Start: 2021-02-24 | End: 2023-01-27

## 2023-01-27 RX ORDER — TOPIRAMATE 100 MG/1
100 CAPSULE, EXTENDED RELEASE ORAL
Refills: 0 | Status: ACTIVE | COMMUNITY

## 2023-01-27 RX ORDER — AMITRIPTYLINE HYDROCHLORIDE 25 MG/1
25 TABLET, FILM COATED ORAL
Qty: 60 | Refills: 1 | Status: COMPLETED | COMMUNITY
Start: 2022-04-12 | End: 2023-01-27

## 2023-01-27 NOTE — REVIEW OF SYSTEMS
[Fatigue] : fatigue [Arthralgias] : arthralgias [Myalgias] : myalgias [Joint Stiffness] : joint stiffness [Joint Swelling] : joint swelling

## 2023-01-31 LAB — CYTOLOGY CVX/VAG DOC THIN PREP: ABNORMAL

## 2023-03-23 ENCOUNTER — RX RENEWAL (OUTPATIENT)
Age: 43
End: 2023-03-23

## 2023-03-24 RX ORDER — CELECOXIB 200 MG/1
200 CAPSULE ORAL
Qty: 90 | Refills: 3 | Status: ACTIVE | COMMUNITY
Start: 2019-07-13

## 2023-03-27 ENCOUNTER — RX RENEWAL (OUTPATIENT)
Age: 43
End: 2023-03-27

## 2023-05-06 ENCOUNTER — NON-APPOINTMENT (OUTPATIENT)
Age: 43
End: 2023-05-06

## 2023-08-01 ENCOUNTER — NON-APPOINTMENT (OUTPATIENT)
Age: 43
End: 2023-08-01

## 2023-09-09 ENCOUNTER — NON-APPOINTMENT (OUTPATIENT)
Age: 43
End: 2023-09-09

## 2023-09-26 ENCOUNTER — APPOINTMENT (OUTPATIENT)
Dept: ENDOCRINOLOGY | Facility: CLINIC | Age: 43
End: 2023-09-26

## 2023-10-04 ENCOUNTER — NON-APPOINTMENT (OUTPATIENT)
Age: 43
End: 2023-10-04

## 2023-10-08 ENCOUNTER — NON-APPOINTMENT (OUTPATIENT)
Age: 43
End: 2023-10-08

## 2023-10-13 ENCOUNTER — NON-APPOINTMENT (OUTPATIENT)
Age: 43
End: 2023-10-13

## 2023-10-20 ENCOUNTER — OUTPATIENT (OUTPATIENT)
Dept: OUTPATIENT SERVICES | Facility: HOSPITAL | Age: 43
LOS: 1 days | End: 2023-10-20
Payer: COMMERCIAL

## 2023-10-20 ENCOUNTER — APPOINTMENT (OUTPATIENT)
Dept: RADIOLOGY | Facility: CLINIC | Age: 43
End: 2023-10-20
Payer: COMMERCIAL

## 2023-10-20 DIAGNOSIS — Z00.8 ENCOUNTER FOR OTHER GENERAL EXAMINATION: ICD-10-CM

## 2023-10-20 PROCEDURE — 71046 X-RAY EXAM CHEST 2 VIEWS: CPT | Mod: 26

## 2023-10-20 PROCEDURE — 71046 X-RAY EXAM CHEST 2 VIEWS: CPT

## 2023-11-01 ENCOUNTER — NON-APPOINTMENT (OUTPATIENT)
Age: 43
End: 2023-11-01

## 2023-11-01 RX ORDER — LEVOTHYROXINE SODIUM 0.09 MG/1
88 TABLET ORAL
Qty: 90 | Refills: 3 | Status: ACTIVE | COMMUNITY
Start: 2018-02-15 | End: 1900-01-01

## 2023-12-12 ENCOUNTER — TRANSCRIPTION ENCOUNTER (OUTPATIENT)
Age: 43
End: 2023-12-12

## 2023-12-13 ENCOUNTER — TRANSCRIPTION ENCOUNTER (OUTPATIENT)
Age: 43
End: 2023-12-13

## 2023-12-18 ENCOUNTER — APPOINTMENT (OUTPATIENT)
Dept: ENDOCRINOLOGY | Facility: CLINIC | Age: 43
End: 2023-12-18
Payer: COMMERCIAL

## 2023-12-18 VITALS
TEMPERATURE: 98 F | WEIGHT: 126 LBS | HEART RATE: 82 BPM | SYSTOLIC BLOOD PRESSURE: 112 MMHG | DIASTOLIC BLOOD PRESSURE: 74 MMHG | HEIGHT: 62 IN | RESPIRATION RATE: 16 BRPM | OXYGEN SATURATION: 98 % | BODY MASS INDEX: 23.19 KG/M2

## 2023-12-18 DIAGNOSIS — E55.9 VITAMIN D DEFICIENCY, UNSPECIFIED: ICD-10-CM

## 2023-12-18 DIAGNOSIS — E03.9 HYPOTHYROIDISM, UNSPECIFIED: ICD-10-CM

## 2023-12-18 DIAGNOSIS — L65.9 NONSCARRING HAIR LOSS, UNSPECIFIED: ICD-10-CM

## 2023-12-18 DIAGNOSIS — E22.1 HYPERPROLACTINEMIA: ICD-10-CM

## 2023-12-18 PROCEDURE — 99214 OFFICE O/P EST MOD 30 MIN: CPT

## 2023-12-18 NOTE — HISTORY OF PRESENT ILLNESS
[FreeTextEntry1] : 43 y.o. female with h/o hypothyroidism diagnosed in 2011 and hyperprolactinemia diagnosed in 2020 presents for follow up visit.   C/o right hip pain and back pain and seeing pain management. Reports right 4th toe stress fracture (5/31/22) that hasn't healed yet, seeing podiatry and received cortisone shots in foot.  Also seeing neurology and reports abnormal CBC.   Regarding hypothyroidism, she originally reported fatigue, hair loss and nail changes which did improve after increasing T4 from 50 mcg daily to 75 mcg daily. These symptoms are still present now. She feels fatigue and hair loss is worse (diagnosed with alopecia in 2011 per pt). Now reports cold intolerance.   Taking LT4 88 mcg daily on an empty stomach and waiting at least 1/2 hour before eating (increased from 75 mcg qd in 4/2022). Antithyroid antibodies are negative; however, she does have SLE. No neck complaints now. No dysphagia, no neck pain or dysphonia.  She does report weight is stable since last visit but frustrated since has been eating better. No exercise because of busy work schedule. Still dealing with constipation and had hemorrhoid procedure in 2/2022, still taking Linzess and OTC medication for worsened constipation. Also seeing dermatology for hair loss.   Mother has hypothyroidism.   Regarding hyperprolactinemia, reports normal menstrual cycles usually, however, has been spotting and bleeding in-between cycle. LMP: 2 weeks ago. Had unremarkable TVUS with GYN. Denies change in vision. No galactorrhea. Also has chronic migraines and sees neurology. Follows with GYN. Follows with psychiatry every 3 months for depression, anxiety and insomnia. Had unremarkable pituitary MRI in March 2021.   In regard to vitamin D def, taking MVI daily.   She has discontinued amitriptyline.

## 2023-12-18 NOTE — REVIEW OF SYSTEMS
[Fatigue] : fatigue [Palpitations] : palpitations [Constipation] : constipation [As Noted in HPI] : as noted in HPI [Joint Pain] : joint pain [Hirsutism] : hirsutism [Hair Loss] : hair loss [Headaches] : headaches [Depression] : depression [Anxiety] : anxiety [Cold Intolerance] : cold intolerance [Negative] : Respiratory [FreeTextEntry2] : night sweats [FreeTextEntry8] : one episode of intermenstrual bleeding [de-identified] : irritation [Recent Weight Gain (___ Lbs)] : no recent weight gain [Recent Weight Loss (___ Lbs)] : no recent weight loss [Dysphagia] : no dysphagia [Neck Pain] : no neck pain [Dysphonia] : no dysphonia [Chest Pain] : no chest pain [Polyuria] : no polyuria [Irregular Menses] : irregular menses [Polydipsia] : no polydipsia [Galactorrhea] : no galactorrhea  [Swelling] : no swelling [FreeTextEntry3] : no change in peripheral vision

## 2023-12-18 NOTE — PHYSICAL EXAM
[Alert] : alert [No Acute Distress] : no acute distress [Normal Sclera/Conjunctiva] : normal sclera/conjunctiva [EOMI] : extra ocular movement intact [No LAD] : no lymphadenopathy [Thyroid Not Enlarged] : the thyroid was not enlarged [No Thyroid Nodules] : no palpable thyroid nodules [No Respiratory Distress] : no respiratory distress [Clear to Auscultation] : lungs were clear to auscultation bilaterally [Normal S1, S2] : normal S1 and S2 [Regular Rhythm] : with a regular rhythm [No Edema] : no peripheral edema [Normal Bowel Sounds] : normal bowel sounds [Not Tender] : non-tender [Not Distended] : not distended [Soft] : abdomen soft [Normal Anterior Cervical Nodes] : no anterior cervical lymphadenopathy [No Clubbing, Cyanosis] : no clubbing  or cyanosis of the fingernails [No Rash] : no rash [Abdominal Striae] : no abdominal striae [Normal Reflexes] : deep tendon reflexes were 2+ and symmetric [Normal Affect] : the affect was normal [Normal Mood] : the mood was normal [de-identified] : mild hand tremors bilaterally [Acanthosis Nigricans] : no acanthosis nigricans

## 2023-12-18 NOTE — ASSESSMENT
[FreeTextEntry1] : 43 y.o. female with h/o hyperprolactinemia, hypothyroidism and vitamin D def.  1. Hyperprolactinemia- Discussed pathophysiology and reviewed etiology. Given mild serum prolactin elevation, suspect medication related. Reviewed patient's medication list and diazepam may increase prolactin levels. Also hypothyroidism can increase serum prolactin levels. Will repeat TFTs and serum prolactin level. Had normal serum macroprolactin in March 2021. Unlikely pituitary microadenoma given unremarkable pituitary MRI in March 2021. Had normal pituitary axis including ACTH with am cortisol, IGF-1 and GH, FSH, LH and estradiol levels in March 2021.  2. Hypothyroidism- Discussed pathophysiology and reviewed sign and symptoms. Discussed possibility of autoimmune thyroid disease. Will check TFTs. Had normal antithyroid antibodies. Will adjust LT4 accordingly.  3. Vitamin D def- Will check 25 vitamin D level and supplement accordingly  4. Irregular Menses- Discussed possible etiologies. Will check TFTs, LH, FSH, estradiol, and prolactin. Advised f/u with GYN, rheumatologist, and establish care with PCP.  5. Alopecia- Will check TFTs. UTD with CBC with ferritin and iron studies. UTD with androgens including DHEAS and testosterone. Recommend follow up with dermatology again if lab work is normal.    Follow up in 1 year.

## 2023-12-19 LAB
25(OH)D3 SERPL-MCNC: 25.6 NG/ML
ALBUMIN SERPL ELPH-MCNC: 4.5 G/DL
ALP BLD-CCNC: 56 U/L
ALT SERPL-CCNC: 16 U/L
ANION GAP SERPL CALC-SCNC: 13 MMOL/L
AST SERPL-CCNC: 20 U/L
BASOPHILS # BLD AUTO: 0.05 K/UL
BASOPHILS NFR BLD AUTO: 0.5 %
BILIRUB SERPL-MCNC: 0.2 MG/DL
BUN SERPL-MCNC: 28 MG/DL
CALCIUM SERPL-MCNC: 9.9 MG/DL
CHLORIDE SERPL-SCNC: 107 MMOL/L
CO2 SERPL-SCNC: 17 MMOL/L
CREAT SERPL-MCNC: 0.9 MG/DL
EGFR: 82 ML/MIN/1.73M2
EOSINOPHIL # BLD AUTO: 0.29 K/UL
EOSINOPHIL NFR BLD AUTO: 2.9 %
ESTRADIOL SERPL-MCNC: 550 PG/ML
FSH SERPL-MCNC: 7.8 IU/L
GLUCOSE SERPL-MCNC: 102 MG/DL
HCT VFR BLD CALC: 39.5 %
HGB BLD-MCNC: 13.7 G/DL
IMM GRANULOCYTES NFR BLD AUTO: 0.4 %
LH SERPL-ACNC: 14.4 IU/L
LYMPHOCYTES # BLD AUTO: 2.56 K/UL
LYMPHOCYTES NFR BLD AUTO: 26 %
MAN DIFF?: NORMAL
MCHC RBC-ENTMCNC: 34.7 GM/DL
MCHC RBC-ENTMCNC: 34.9 PG
MCV RBC AUTO: 100.8 FL
MONOCYTES # BLD AUTO: 0.98 K/UL
MONOCYTES NFR BLD AUTO: 9.9 %
NEUTROPHILS # BLD AUTO: 5.94 K/UL
NEUTROPHILS NFR BLD AUTO: 60.3 %
PLATELET # BLD AUTO: 321 K/UL
POTASSIUM SERPL-SCNC: 3.6 MMOL/L
PROLACTIN SERPL-MCNC: 35.7 NG/ML
PROT SERPL-MCNC: 6.7 G/DL
RBC # BLD: 3.92 M/UL
RBC # FLD: 13.4 %
SODIUM SERPL-SCNC: 137 MMOL/L
T4 FREE SERPL-MCNC: 1.3 NG/DL
TSH SERPL-ACNC: 2.03 UIU/ML
WBC # FLD AUTO: 9.86 K/UL

## 2024-02-01 ENCOUNTER — APPOINTMENT (OUTPATIENT)
Dept: OBGYN | Facility: CLINIC | Age: 44
End: 2024-02-01

## 2024-02-14 ENCOUNTER — TRANSCRIPTION ENCOUNTER (OUTPATIENT)
Age: 44
End: 2024-02-14

## 2024-04-10 ENCOUNTER — APPOINTMENT (OUTPATIENT)
Dept: OBGYN | Facility: CLINIC | Age: 44
End: 2024-04-10

## 2024-04-20 ENCOUNTER — APPOINTMENT (OUTPATIENT)
Dept: OBGYN | Facility: CLINIC | Age: 44
End: 2024-04-20
Payer: COMMERCIAL

## 2024-04-20 VITALS
HEART RATE: 76 BPM | HEIGHT: 62 IN | WEIGHT: 120 LBS | SYSTOLIC BLOOD PRESSURE: 112 MMHG | DIASTOLIC BLOOD PRESSURE: 80 MMHG | BODY MASS INDEX: 22.08 KG/M2

## 2024-04-20 DIAGNOSIS — Z01.419 ENCOUNTER FOR GYNECOLOGICAL EXAMINATION (GENERAL) (ROUTINE) W/OUT ABNORMAL FINDINGS: ICD-10-CM

## 2024-04-20 PROCEDURE — 99396 PREV VISIT EST AGE 40-64: CPT

## 2024-04-20 PROCEDURE — 36415 COLL VENOUS BLD VENIPUNCTURE: CPT

## 2024-04-20 PROCEDURE — 99459 PELVIC EXAMINATION: CPT

## 2024-04-20 RX ORDER — TOPIRAMATE 200 MG/1
CAPSULE, EXTENDED RELEASE ORAL
Refills: 0 | Status: DISCONTINUED | COMMUNITY
End: 2024-04-20

## 2024-04-20 RX ORDER — ATOGEPANT 60 MG/1
TABLET ORAL
Refills: 0 | Status: DISCONTINUED | COMMUNITY
End: 2024-04-20

## 2024-04-21 LAB
HBV SURFACE AG SER QL: NONREACTIVE
HCV AB SER QL: NONREACTIVE
HCV S/CO RATIO: 0.1 S/CO
HIV1+2 AB SPEC QL IA.RAPID: NONREACTIVE
PROLACTIN SERPL-MCNC: 28.1 NG/ML
TSH SERPL-ACNC: 0.54 UIU/ML

## 2024-04-22 LAB
HPV HIGH+LOW RISK DNA PNL CVX: NOT DETECTED
T PALLIDUM AB SER QL IA: NEGATIVE

## 2024-04-22 NOTE — PHYSICAL EXAM
[Chaperone Present] : A chaperone was present in the examining room during all aspects of the physical examination [[id number]] : A chaperone was present during the pelvic exam. [FreeTextEntry2] : JAVIER Quarles [Appropriately responsive] : appropriately responsive [Alert] : alert [No Acute Distress] : no acute distress [No Lymphadenopathy] : no lymphadenopathy [Regular Rate Rhythm] : regular rate rhythm [No Murmurs] : no murmurs [Clear to Auscultation B/L] : clear to auscultation bilaterally [Soft] : soft [Non-tender] : non-tender [Non-distended] : non-distended [No HSM] : No HSM [No Lesions] : no lesions [No Mass] : no mass [Oriented x3] : oriented x3 [Examination Of The Breasts] : a normal appearance [No Masses] : no breast masses were palpable [Labia Majora] : normal [Labia Minora] : normal [Normal] : normal [Tenderness] : nontender [Enlarged ___ wks] : not enlarged [Uterine Adnexae] : normal

## 2024-04-22 NOTE — PLAN
[FreeTextEntry1] : 45 y/o presents for annual visit.   #HM -Pap with HPV today  at pt's request. Pt educated on [facility] pap screening guidelines  -Mammo requisition given -STI panel  #Irregular bleeding -Pt's mother went through menopause at 40, could be perimenopausal bleeding -Pt with known hypothyroidism and hyperprolactinemia, will check labs today as this could be impacting periods   monica ARNOLD

## 2024-04-22 NOTE — HISTORY OF PRESENT ILLNESS
[FreeTextEntry1] : 45 y/o presents for annual visit. Pt with hx of hyperprolactinemia  Pt reports menstrual changes over the past year. Reports staining 3/20 that occurred on and off for about 2 weeks, reports staining last week as well. Reports last "normal" period was over 1 year ago.  Pt's mother went through menopause at 40  Sexually active, using condoms. Accepts STI testing   HM Pap NILM HPV neg 2023  Mammogram 2021

## 2024-04-24 LAB — CYTOLOGY CVX/VAG DOC THIN PREP: NORMAL

## 2024-06-10 ENCOUNTER — APPOINTMENT (OUTPATIENT)
Dept: HUMAN REPRODUCTION | Facility: CLINIC | Age: 44
End: 2024-06-10
Payer: COMMERCIAL

## 2024-06-10 ENCOUNTER — TRANSCRIPTION ENCOUNTER (OUTPATIENT)
Age: 44
End: 2024-06-10

## 2024-06-10 PROCEDURE — 36415 COLL VENOUS BLD VENIPUNCTURE: CPT

## 2024-06-10 PROCEDURE — 99205 OFFICE O/P NEW HI 60 MIN: CPT | Mod: 25

## 2024-06-10 PROCEDURE — 76830 TRANSVAGINAL US NON-OB: CPT

## 2024-06-19 ENCOUNTER — RESULT REVIEW (OUTPATIENT)
Age: 44
End: 2024-06-19

## 2024-06-19 ENCOUNTER — APPOINTMENT (OUTPATIENT)
Dept: MAMMOGRAPHY | Facility: CLINIC | Age: 44
End: 2024-06-19
Payer: COMMERCIAL

## 2024-06-19 PROCEDURE — 77067 SCR MAMMO BI INCL CAD: CPT

## 2024-06-19 PROCEDURE — 77063 BREAST TOMOSYNTHESIS BI: CPT

## 2024-06-24 ENCOUNTER — TRANSCRIPTION ENCOUNTER (OUTPATIENT)
Age: 44
End: 2024-06-24

## 2024-07-15 ENCOUNTER — ASOB RESULT (OUTPATIENT)
Age: 44
End: 2024-07-15

## 2024-07-15 ENCOUNTER — APPOINTMENT (OUTPATIENT)
Dept: MATERNAL FETAL MEDICINE | Facility: CLINIC | Age: 44
End: 2024-07-15
Payer: COMMERCIAL

## 2024-07-15 PROCEDURE — 99204 OFFICE O/P NEW MOD 45 MIN: CPT | Mod: 95

## 2024-07-17 ENCOUNTER — APPOINTMENT (OUTPATIENT)
Dept: HUMAN REPRODUCTION | Facility: CLINIC | Age: 44
End: 2024-07-17
Payer: COMMERCIAL

## 2024-07-17 PROCEDURE — 99215 OFFICE O/P EST HI 40 MIN: CPT

## 2024-08-22 ENCOUNTER — APPOINTMENT (OUTPATIENT)
Dept: ORTHOPEDIC SURGERY | Facility: CLINIC | Age: 44
End: 2024-08-22

## 2024-08-22 DIAGNOSIS — M25.551 PAIN IN RIGHT HIP: ICD-10-CM

## 2024-08-22 PROCEDURE — 20610 DRAIN/INJ JOINT/BURSA W/O US: CPT

## 2024-08-22 PROCEDURE — 72100 X-RAY EXAM L-S SPINE 2/3 VWS: CPT

## 2024-08-22 PROCEDURE — 73502 X-RAY EXAM HIP UNI 2-3 VIEWS: CPT

## 2024-08-22 PROCEDURE — 99204 OFFICE O/P NEW MOD 45 MIN: CPT | Mod: 25

## 2024-08-22 RX ORDER — DICLOFENAC SODIUM 75 MG/1
75 TABLET, DELAYED RELEASE ORAL
Qty: 60 | Refills: 1 | Status: ACTIVE | COMMUNITY
Start: 2024-08-22 | End: 1900-01-01

## 2024-08-22 NOTE — ASSESSMENT
[FreeTextEntry1] : 44F p/w R hip impingement, R troch bursitis  diclofenac for pain R GT csi tolerated well referral for IA CSI given return 6 weeks prn   The risks, benefits, contents and alternatives to injection were explained in full to the patient. Risks outlined include but are not limited to infection, sepsis, bleeding, scarring, skin discoloration, temporary increase in pain, syncopal episode, failure to resolve symptoms, allergic reaction, flare reaction, permanent white skin discoloration, symptom recurrence, and elevation of blood sugar in diabetics. Patient understood the risks. All questions were answered. After discussion of options, patient requested an injection. Oral informed consent was obtained and sterile prep was done of the injection site. Sterile technique was used to introduce the mixture. Patient tolerated the procedure well. Patient advised to ice the injection site this evening. Signs and symptoms of infection reviewed and patient advised to call immediately for redness, fevers, and/or chills.

## 2024-08-22 NOTE — IMAGING
[de-identified] : RIGHT HIP + Groin tenderness + Greater troch bursa tenderness + Buttock tenderness + Impingement Pain in groin with flexion, internal rotation NVI

## 2024-08-22 NOTE — HISTORY OF PRESENT ILLNESS
[Sudden] : sudden [8] : 8 [Sharp] : sharp [Throbbing] : throbbing [Constant] : constant [Leisure] : leisure [Work] : work [Sleep] : sleep [Nothing helps with pain getting better] : Nothing helps with pain getting better [Full time] : Work status: full time [de-identified] : 8/22/24: 43yo F with right hip/groin pain for the past couple of weeks with no injury. She states this is a longstanding issue and has been treated by outside pain management in the past. Had injections (LESI and ?IA hip CSI) that have been beneficial in the past. Taking gabapentin without sig relief. Admits to radiating pain down the right leg; denies N/T, weakness, b/b incontinence.  [] : no [FreeTextEntry5] : A few week ago she started having a lot of hip pain, radiating down her leg.  Hx of chronic RT hip pain  [FreeTextEntry6] : getting a feeling of coldness in her foot.  [de-identified] : no recent  [de-identified] : Hx of CSI and TPI. Has done PT in the past with no relief.  [de-identified] : CPS

## 2024-08-22 NOTE — PROCEDURE
[Large Joint Injection] : Large joint injection [Right] : of the right [Greater Trochanteric Bursa] : greater trochanteric bursa [Pain] : pain [Inflammation] : inflammation [Alcohol] : alcohol [Betadine] : betadine [Ethyl Chloride sprayed topically] : ethyl chloride sprayed topically [Sterile technique used] : sterile technique used [___ cc    0.5%] : Bupivacaine (Marcaine) ~Vcc of 0.5%  [___ cc    40mg] : Triamcinolone (Kenalog) ~Vcc of 40 mg  [Call if redness, pain or fever occur] : call if redness, pain or fever occur [Apply ice for 15min out of every hour for the next 12-24 hours as tolerated] : apply ice for 15 minutes out of every hour for the next 12-24 hours as tolerated [Previous OTC use and PT nontherapeutic] : patient has tried OTC's including aspirin, Ibuprofen, Aleve, etc or prescription NSAIDS, and/or exercises at home and/or physical therapy without satisfactory response [Risks, benefits, alternatives discussed / Verbal consent obtained] : the risks benefits, and alternatives have been discussed, and verbal consent was obtained

## 2024-08-23 ENCOUNTER — NON-APPOINTMENT (OUTPATIENT)
Age: 44
End: 2024-08-23

## 2024-08-28 ENCOUNTER — RESULT REVIEW (OUTPATIENT)
Age: 44
End: 2024-08-28
